# Patient Record
Sex: FEMALE | Race: WHITE
[De-identification: names, ages, dates, MRNs, and addresses within clinical notes are randomized per-mention and may not be internally consistent; named-entity substitution may affect disease eponyms.]

---

## 2017-01-11 ENCOUNTER — PRIOR ORIGINAL RECORDS (OUTPATIENT)
Dept: OTHER | Age: 82
End: 2017-01-11

## 2017-02-22 ENCOUNTER — PRIOR ORIGINAL RECORDS (OUTPATIENT)
Dept: OTHER | Age: 82
End: 2017-02-22

## 2017-02-22 ENCOUNTER — MYAURORA ACCOUNT LINK (OUTPATIENT)
Dept: OTHER | Age: 82
End: 2017-02-22

## 2017-04-19 ENCOUNTER — HOSPITAL (OUTPATIENT)
Dept: OTHER | Age: 82
End: 2017-04-19
Attending: INTERNAL MEDICINE

## 2017-04-19 LAB
25(OH)D3+25(OH)D2 SERPL-MCNC: 38.6 NG/ML (ref 30–100)
AFP-TM SERPL-MCNC: 6 NG/ML (ref 0–9)
ALBUMIN SERPL-MCNC: 3.9 GM/DL (ref 3.6–5.1)
ALBUMIN/GLOB SERPL: 1.3 {RATIO} (ref 1–2.4)
ALP SERPL-CCNC: 103 UNIT/L (ref 45–117)
ALT SERPL-CCNC: 16 UNIT/L
ANALYZER ANC (IANC): NORMAL
ANION GAP SERPL CALC-SCNC: 12 MMOL/L (ref 10–20)
AST SERPL-CCNC: 15 UNIT/L
BILIRUB SERPL-MCNC: 0.5 MG/DL (ref 0.2–1)
BUN SERPL-MCNC: 28 MG/DL (ref 6–20)
BUN/CREAT SERPL: 21 (ref 7–25)
CALCIUM SERPL-MCNC: 8.9 MG/DL (ref 8.4–10.2)
CHLORIDE: 107 MMOL/L (ref 98–107)
CHOLEST SERPL-MCNC: 158 MG/DL
CHOLEST/HDLC SERPL: 2.3 {RATIO}
CO2 SERPL-SCNC: 28 MMOL/L (ref 21–32)
CREAT SERPL-MCNC: 1.35 MG/DL (ref 0.51–0.95)
ERYTHROCYTE [DISTWIDTH] IN BLOOD: 12.7 % (ref 11–15)
GLOBULIN SER-MCNC: 3.1 GM/DL (ref 2–4)
GLUCOSE SERPL-MCNC: 100 MG/DL (ref 65–99)
HDLC SERPL-MCNC: 70 MG/DL
HEMATOCRIT: 37.8 % (ref 36–46.5)
HGB BLD-MCNC: 12.4 GM/DL (ref 12–15.5)
LDLC SERPL CALC-MCNC: 70 MG/DL
LENGTH OF FAST TIME PATIENT: ABNORMAL HR
LENGTH OF FAST TIME PATIENT: NORMAL HR
MCH RBC QN AUTO: 30.3 PG (ref 26–34)
MCHC RBC AUTO-ENTMCNC: 32.8 GM/DL (ref 32–36.5)
MCV RBC AUTO: 92.4 FL (ref 78–100)
NONHDLC SERPL-MCNC: 88 MG/DL
PLATELET # BLD: 142 THOUSAND/MCL (ref 140–450)
POTASSIUM SERPL-SCNC: 5 MMOL/L (ref 3.4–5.1)
PROT SERPL-MCNC: 7 GM/DL (ref 6.4–8.2)
RBC # BLD: 4.09 MILLION/MCL (ref 4–5.2)
SODIUM SERPL-SCNC: 142 MMOL/L (ref 135–145)
TRIGLYCERIDE (TRIGP): 90 MG/DL
WBC # BLD: 5.6 THOUSAND/MCL (ref 4.2–11)

## 2017-04-20 ENCOUNTER — PRIOR ORIGINAL RECORDS (OUTPATIENT)
Dept: OTHER | Age: 82
End: 2017-04-20

## 2017-04-20 LAB
ALBUMIN: 3.9 G/DL
ALKALINE PHOSPHATATE(ALK PHOS): 103 IU/L
ALT (SGPT): 16 U/L
AST (SGOT): 15 U/L
BILIRUBIN TOTAL: 0.5 MG/DL
BILIRUBIN TOTAL: 0.5 MG/DL
BUN: 28 MG/DL
CALCIUM: 8.9 MG/DL
CHLORIDE: 107 MEQ/L
CHOLESTEROL, TOTAL: 158 MG/DL
CREATININE, SERUM: 1.35 MG/DL
GLOBULIN: 3.1 G/DL
GLUCOSE: 100 MG/DL
GLUCOSE: 100 MG/DL
HDL CHOLESTEROL: 70 MG/DL
HEMATOCRIT: 37.8 %
HEMOGLOBIN: 12.4 G/DL
LDL CHOLESTEROL: 70 MG/DL
MCH: 30.3 PG
MCHC: 32.8 G/DL
MCV: 92.4 FL
NON-HDL CHOLESTEROL: 88 MG/DL
PLATELETS: 142 K/UL
POTASSIUM, SERUM: 5 MEQ/L
PROTEIN, TOTAL: 7 G/DL
RED BLOOD COUNT: 4.09 X 10-6/U
SGOT (AST): 15 IU/L
SGPT (ALT): 16 IU/L
SODIUM: 142 MEQ/L
TOTAL CHOLESTEROL / HDL RATIO: 2.3 RATIO UN
TRIGLYCERIDES: 90 MG/DL
VITAMIN D 25-OH: 38.6 NG/ML
WHITE BLOOD COUNT: 5.6 X 10-3/U

## 2017-06-05 PROBLEM — M48.061 SPINAL STENOSIS OF LUMBAR REGION AT MULTIPLE LEVELS: Status: ACTIVE | Noted: 2017-06-05

## 2017-06-05 PROBLEM — R26.9 GAIT DIFFICULTY: Status: ACTIVE | Noted: 2017-06-05

## 2017-06-06 PROCEDURE — 84165 PROTEIN E-PHORESIS SERUM: CPT | Performed by: OTHER

## 2017-06-06 PROCEDURE — 82607 VITAMIN B-12: CPT | Performed by: OTHER

## 2017-06-06 PROCEDURE — 86334 IMMUNOFIX E-PHORESIS SERUM: CPT | Performed by: OTHER

## 2017-06-06 PROCEDURE — 83883 ASSAY NEPHELOMETRY NOT SPEC: CPT | Performed by: OTHER

## 2017-08-23 ENCOUNTER — MYAURORA ACCOUNT LINK (OUTPATIENT)
Dept: OTHER | Age: 82
End: 2017-08-23

## 2017-08-23 ENCOUNTER — PRIOR ORIGINAL RECORDS (OUTPATIENT)
Dept: OTHER | Age: 82
End: 2017-08-23

## 2017-09-21 ENCOUNTER — PRIOR ORIGINAL RECORDS (OUTPATIENT)
Dept: OTHER | Age: 82
End: 2017-09-21

## 2017-09-21 ENCOUNTER — HOSPITAL (OUTPATIENT)
Dept: OTHER | Age: 82
End: 2017-09-21
Attending: INTERNAL MEDICINE

## 2017-09-21 LAB
25(OH)D3+25(OH)D2 SERPL-MCNC: 36.3 NG/ML (ref 30–100)
ALBUMIN SERPL-MCNC: 3.7 GM/DL (ref 3.6–5.1)
ALBUMIN/GLOB SERPL: 1.2 {RATIO} (ref 1–2.4)
ALP SERPL-CCNC: 183 UNIT/L (ref 45–117)
ALT SERPL-CCNC: 19 UNIT/L
ANALYZER ANC (IANC): ABNORMAL
ANION GAP SERPL CALC-SCNC: 14 MMOL/L (ref 10–20)
AST SERPL-CCNC: 13 UNIT/L
BILIRUB SERPL-MCNC: 0.5 MG/DL (ref 0.2–1)
BUN SERPL-MCNC: 31 MG/DL (ref 6–20)
BUN/CREAT SERPL: 23 (ref 7–25)
CALCIUM SERPL-MCNC: 8.9 MG/DL (ref 8.4–10.2)
CHLORIDE: 107 MMOL/L (ref 98–107)
CHOLEST SERPL-MCNC: 156 MG/DL
CHOLEST/HDLC SERPL: 2.9 {RATIO}
CO2 SERPL-SCNC: 26 MMOL/L (ref 21–32)
CREAT SERPL-MCNC: 1.32 MG/DL (ref 0.51–0.95)
ERYTHROCYTE [DISTWIDTH] IN BLOOD: 12.9 % (ref 11–15)
GLOBULIN SER-MCNC: 3.2 GM/DL (ref 2–4)
GLUCOSE SERPL-MCNC: 105 MG/DL (ref 65–99)
HDLC SERPL-MCNC: 53 MG/DL
HEMATOCRIT: 36.3 % (ref 36–46.5)
HGB BLD-MCNC: 11.8 GM/DL (ref 12–15.5)
LDLC SERPL CALC-MCNC: 75 MG/DL
LENGTH OF FAST TIME PATIENT: ABNORMAL HR
LENGTH OF FAST TIME PATIENT: NORMAL HR
MCH RBC QN AUTO: 30.6 PG (ref 26–34)
MCHC RBC AUTO-ENTMCNC: 32.5 GM/DL (ref 32–36.5)
MCV RBC AUTO: 94 FL (ref 78–100)
NONHDLC SERPL-MCNC: 103 MG/DL
PLATELET # BLD: 177 THOUSAND/MCL (ref 140–450)
POTASSIUM SERPL-SCNC: 4.7 MMOL/L (ref 3.4–5.1)
PROT SERPL-MCNC: 6.9 GM/DL (ref 6.4–8.2)
RBC # BLD: 3.86 MILLION/MCL (ref 4–5.2)
SODIUM SERPL-SCNC: 142 MMOL/L (ref 135–145)
TRIGLYCERIDE (TRIGP): 141 MG/DL
WBC # BLD: 6.1 THOUSAND/MCL (ref 4.2–11)

## 2017-09-22 LAB
ALBUMIN: 3.7 G/DL
ALKALINE PHOSPHATATE(ALK PHOS): 183 IU/L
ALT (SGPT): 19 U/L
AST (SGOT): 13 U/L
BILIRUBIN TOTAL: 0.5 MG/DL
BUN: 31 MG/DL
CALCIUM: 8.9 MG/DL
CHLORIDE: 107 MEQ/L
CHOLESTEROL, TOTAL: 156 MG/DL
CREATININE, SERUM: 1.32 MG/DL
GLOBULIN: 3.2 G/DL
HDL CHOLESTEROL: 53 MG/DL
HEMATOCRIT: 36.3 %
HEMOGLOBIN: 11.8 G/DL
LDL CHOLESTEROL: 75 MG/DL
MCH: 30.6 PG
MCHC: 32.5 G/DL
MCV: 94 FL
PLATELETS: 177 K/UL
POTASSIUM, SERUM: 4.7 MEQ/L
PROTEIN, TOTAL: 6.9 G/DL
RED BLOOD COUNT: 3.86 X 10-6/U
SGOT (AST): 13 IU/L
SGPT (ALT): 19 IU/L
SODIUM: 142 MEQ/L
TRIGLYCERIDES: 141 MG/DL
VITAMIN D 25-OH: 36.3 NG/ML
WHITE BLOOD COUNT: 6.1 X 10-3/U

## 2017-09-25 ENCOUNTER — PRIOR ORIGINAL RECORDS (OUTPATIENT)
Dept: OTHER | Age: 82
End: 2017-09-25

## 2017-09-27 ENCOUNTER — HOSPITAL ENCOUNTER (OUTPATIENT)
Dept: ELECTROPHYSIOLOGY | Facility: HOSPITAL | Age: 82
Discharge: HOME | End: 2017-09-27
Payer: MEDICARE

## 2017-09-27 PROCEDURE — 95911 NRV CNDJ TEST 9-10 STUDIES: CPT

## 2017-09-27 PROCEDURE — 95885 MUSC TST DONE W/NERV TST LIM: CPT

## 2017-10-11 ENCOUNTER — MYAURORA ACCOUNT LINK (OUTPATIENT)
Dept: OTHER | Age: 82
End: 2017-10-11

## 2017-10-12 ENCOUNTER — PRIOR ORIGINAL RECORDS (OUTPATIENT)
Dept: OTHER | Age: 82
End: 2017-10-12

## 2017-10-18 ENCOUNTER — PRIOR ORIGINAL RECORDS (OUTPATIENT)
Dept: OTHER | Age: 82
End: 2017-10-18

## 2017-10-19 ENCOUNTER — PRIOR ORIGINAL RECORDS (OUTPATIENT)
Dept: OTHER | Age: 82
End: 2017-10-19

## 2017-11-01 ENCOUNTER — HOSPITAL ENCOUNTER (OUTPATIENT)
Dept: CT IMAGING | Facility: HOSPITAL | Age: 82
Discharge: HOME | End: 2017-11-01
Attending: Other
Payer: MEDICARE

## 2017-11-01 DIAGNOSIS — R29.6 FALLS FREQUENTLY: ICD-10-CM

## 2017-11-01 PROCEDURE — 82565 ASSAY OF CREATININE: CPT

## 2017-11-01 PROCEDURE — 70450 CT HEAD/BRAIN W/O DYE: CPT | Performed by: OTHER

## 2017-11-16 ENCOUNTER — PRIOR ORIGINAL RECORDS (OUTPATIENT)
Dept: OTHER | Age: 82
End: 2017-11-16

## 2017-12-07 ENCOUNTER — APPOINTMENT (OUTPATIENT)
Dept: CT IMAGING | Facility: HOSPITAL | Age: 82
End: 2017-12-07
Attending: EMERGENCY MEDICINE
Payer: MEDICARE

## 2017-12-07 ENCOUNTER — APPOINTMENT (OUTPATIENT)
Dept: GENERAL RADIOLOGY | Facility: HOSPITAL | Age: 82
End: 2017-12-07
Attending: EMERGENCY MEDICINE
Payer: MEDICARE

## 2017-12-07 ENCOUNTER — HOSPITAL ENCOUNTER (EMERGENCY)
Facility: HOSPITAL | Age: 82
Discharge: HOME | End: 2017-12-07
Attending: EMERGENCY MEDICINE
Payer: MEDICARE

## 2017-12-07 VITALS
HEIGHT: 66 IN | RESPIRATION RATE: 16 BRPM | OXYGEN SATURATION: 97 % | SYSTOLIC BLOOD PRESSURE: 198 MMHG | WEIGHT: 130 LBS | HEART RATE: 64 BPM | BODY MASS INDEX: 20.89 KG/M2 | TEMPERATURE: 97 F | DIASTOLIC BLOOD PRESSURE: 71 MMHG

## 2017-12-07 DIAGNOSIS — S92.902A FOOT FRACTURE, LEFT, CLOSED, INITIAL ENCOUNTER: Primary | ICD-10-CM

## 2017-12-07 PROCEDURE — 28490 TREAT BIG TOE FRACTURE: CPT

## 2017-12-07 PROCEDURE — 70450 CT HEAD/BRAIN W/O DYE: CPT | Performed by: EMERGENCY MEDICINE

## 2017-12-07 PROCEDURE — 28470 CLTX METATARSAL FX WO MNP EA: CPT

## 2017-12-07 PROCEDURE — 73630 X-RAY EXAM OF FOOT: CPT | Performed by: EMERGENCY MEDICINE

## 2017-12-07 PROCEDURE — 99284 EMERGENCY DEPT VISIT MOD MDM: CPT

## 2017-12-07 RX ORDER — ACETAMINOPHEN 500 MG
TABLET ORAL
Status: COMPLETED
Start: 2017-12-07 | End: 2017-12-07

## 2017-12-07 RX ORDER — ACETAMINOPHEN 500 MG
1000 TABLET ORAL ONCE
Status: COMPLETED | OUTPATIENT
Start: 2017-12-07 | End: 2017-12-07

## 2017-12-07 NOTE — ED PROVIDER NOTES
Patient Seen in: [facility] AND [facility] Emergency Department    History   Patient presents with:  Lower Extremity Injury (musculoskeletal)    Stated Complaint: left foot injury    HPI    81year old female complains of bruising to her left foot after falling th BuPROPion HCl ER, SR, (WELLBUTRIN SR) 150 MG Oral Tablet 12 Hr,  Take 150 mg by mouth 2 (two) times daily. PRADAXA 75 MG Oral Cap,  Take 75 mg by mouth 2 (two) times daily.    Memantine HCl (NAMENDA) 10 MG Oral Tab,  Take 10 mg by mouth 2 (two) times greg Mouth/Throat: Mucous membranes are normal. Mucous membranes are not pale and not cyanotic. Eyes: Conjunctivae and lids are normal. Right conjunctiva is not injected. Left conjunctiva is not injected. No scleral icterus.  Pupils are equal.   Neck: Normal r There is osteopenia. Mild joint space narrowing with small osteophytes at     the first MTP joint. Degenerative changes are seen throughout the PIP and     DIP joints. There is a plantar calcaneal enthesophyte.  There is a small     enthesophyte at the i CALVARIUM: No apparent fracture, mass, or other significant visible     lesion. SINUSES: Limited views demonstrate no significant mucosal thickening or     fluid. ORBITS: Previous  bilateral cataract surgery.          OTHER: Atherosclerotic calc Disposition and Plan     Clinical Impression:  Foot fracture, left, closed, initial encounter  (primary encounter diagnosis)    Disposition:  Discharge    Follow-up:  Jackie Fisher, [address] [facility] [address] [address] [phone]    Schedule

## 2017-12-07 NOTE — ED NOTES
PT safe to DC home per MD. Jason Peer to dress self. DC teaching done, pt verbalizes understanding. Wheeled to exit.

## 2017-12-08 PROBLEM — S92.912A UNSPECIFIED FRACTURE OF LEFT TOE(S), INITIAL ENCOUNTER FOR CLOSED FRACTURE: Status: ACTIVE | Noted: 2017-12-08

## 2017-12-11 PROBLEM — S92.412A DISPLACED FRACTURE OF PROXIMAL PHALANX OF LEFT GREAT TOE, INITIAL ENCOUNTER FOR CLOSED FRACTURE: Status: ACTIVE | Noted: 2017-12-08

## 2017-12-11 PROBLEM — S92.322A CLOSED DISPLACED FRACTURE OF SECOND METATARSAL BONE OF LEFT FOOT, INITIAL ENCOUNTER: Status: ACTIVE | Noted: 2017-12-11

## 2017-12-11 PROBLEM — S92.342A CLOSED DISPLACED FRACTURE OF FOURTH METATARSAL BONE OF LEFT FOOT, INITIAL ENCOUNTER: Status: ACTIVE | Noted: 2017-12-11

## 2017-12-11 PROBLEM — S92.332A CLOSED DISPLACED FRACTURE OF THIRD METATARSAL BONE OF LEFT FOOT, INITIAL ENCOUNTER: Status: ACTIVE | Noted: 2017-12-11

## 2018-02-27 ENCOUNTER — MYAURORA ACCOUNT LINK (OUTPATIENT)
Dept: OTHER | Age: 83
End: 2018-02-27

## 2018-02-27 ENCOUNTER — PRIOR ORIGINAL RECORDS (OUTPATIENT)
Dept: OTHER | Age: 83
End: 2018-02-27

## 2018-05-16 ENCOUNTER — HOSPITAL (OUTPATIENT)
Dept: OTHER | Age: 83
End: 2018-05-16
Attending: INTERNAL MEDICINE

## 2018-05-16 ENCOUNTER — PRIOR ORIGINAL RECORDS (OUTPATIENT)
Dept: OTHER | Age: 83
End: 2018-05-16

## 2018-05-16 LAB
25(OH)D3+25(OH)D2 SERPL-MCNC: 33 NG/ML (ref 30–100)
ALBUMIN SERPL-MCNC: 3.8 GM/DL (ref 3.6–5.1)
ALBUMIN/GLOB SERPL: 1.2 {RATIO} (ref 1–2.4)
ALP SERPL-CCNC: 105 UNIT/L (ref 45–117)
ALT SERPL-CCNC: 21 UNIT/L
ANALYZER ANC (IANC): ABNORMAL
ANION GAP SERPL CALC-SCNC: 11 MMOL/L (ref 10–20)
AST SERPL-CCNC: 16 UNIT/L
BILIRUB SERPL-MCNC: 0.6 MG/DL (ref 0.2–1)
BUN SERPL-MCNC: 34 MG/DL (ref 6–20)
BUN/CREAT SERPL: 21 (ref 7–25)
CALCIUM SERPL-MCNC: 9 MG/DL (ref 8.4–10.2)
CHLORIDE: 108 MMOL/L (ref 98–107)
CHOLEST SERPL-MCNC: 157 MG/DL
CHOLEST/HDLC SERPL: 2 {RATIO}
CO2 SERPL-SCNC: 28 MMOL/L (ref 21–32)
CREAT SERPL-MCNC: 1.62 MG/DL (ref 0.51–0.95)
ERYTHROCYTE [DISTWIDTH] IN BLOOD: 12.6 % (ref 11–15)
GLOBULIN SER-MCNC: 3.3 GM/DL (ref 2–4)
GLUCOSE SERPL-MCNC: 101 MG/DL (ref 65–99)
HDLC SERPL-MCNC: 79 MG/DL
HEMATOCRIT: 36.6 % (ref 36–46.5)
HGB BLD-MCNC: 11.9 GM/DL (ref 12–15.5)
LDLC SERPL CALC-MCNC: 62 MG/DL
LENGTH OF FAST TIME PATIENT: ABNORMAL HR
LENGTH OF FAST TIME PATIENT: NORMAL HR
MCH RBC QN AUTO: 30.1 PG (ref 26–34)
MCHC RBC AUTO-ENTMCNC: 32.5 GM/DL (ref 32–36.5)
MCV RBC AUTO: 92.4 FL (ref 78–100)
NONHDLC SERPL-MCNC: 78 MG/DL
NRBC (NRBCRE): ABNORMAL
PLATELET # BLD: 166 THOUSAND/MCL (ref 140–450)
POTASSIUM SERPL-SCNC: 5.1 MMOL/L (ref 3.4–5.1)
PROT SERPL-MCNC: 7.1 GM/DL (ref 6.4–8.2)
RBC # BLD: 3.96 MILLION/MCL (ref 4–5.2)
SODIUM SERPL-SCNC: 142 MMOL/L (ref 135–145)
TRIGLYCERIDE (TRIGP): 81 MG/DL
WBC # BLD: 5.7 THOUSAND/MCL (ref 4.2–11)

## 2018-05-17 LAB
ALBUMIN: 3.8 G/DL
ALKALINE PHOSPHATATE(ALK PHOS): 105 IU/L
BILIRUBIN TOTAL: 0.6 MG/DL
BUN: 34 MG/DL
CALCIUM: 9 MG/DL
CHLORIDE: 108 MEQ/L
CHOLESTEROL, TOTAL: 157 MG/DL
CREATININE, SERUM: 1.62 MG/DL
GLOBULIN: 3.3 G/DL
GLUCOSE: 101 MG/DL
HDL CHOLESTEROL: 79 MG/DL
HEMATOCRIT: 36.6 %
HEMOGLOBIN: 11.9 G/DL
LDL CHOLESTEROL: 62 MG/DL
MCH: 30.1 PG
MCHC: 32.5 G/DL
MCV: 92.4 FL
PLATELETS: 166 K/UL
POTASSIUM, SERUM: 5.1 MEQ/L
PROTEIN, TOTAL: 7.1 G/DL
RED BLOOD COUNT: 3.96 X 10-6/U
SGOT (AST): 16 IU/L
SGPT (ALT): 21 IU/L
SODIUM: 142 MEQ/L
TRIGLYCERIDES: 81 MG/DL
VITAMIN D 25-OH: 33 NG/ML
WHITE BLOOD COUNT: 5.7 X 10-3/U

## 2018-05-30 ENCOUNTER — PRIOR ORIGINAL RECORDS (OUTPATIENT)
Dept: OTHER | Age: 83
End: 2018-05-30

## 2018-06-06 ENCOUNTER — APPOINTMENT (OUTPATIENT)
Dept: GENERAL RADIOLOGY | Facility: HOSPITAL | Age: 83
End: 2018-06-06
Attending: EMERGENCY MEDICINE
Payer: MEDICARE

## 2018-06-06 ENCOUNTER — APPOINTMENT (OUTPATIENT)
Dept: CT IMAGING | Facility: HOSPITAL | Age: 83
End: 2018-06-06
Attending: EMERGENCY MEDICINE
Payer: MEDICARE

## 2018-06-06 ENCOUNTER — HOSPITAL ENCOUNTER (EMERGENCY)
Facility: HOSPITAL | Age: 83
Discharge: HOME | End: 2018-06-06
Attending: EMERGENCY MEDICINE
Payer: MEDICARE

## 2018-06-06 VITALS
WEIGHT: 128 LBS | OXYGEN SATURATION: 96 % | BODY MASS INDEX: 25.13 KG/M2 | TEMPERATURE: 98 F | HEART RATE: 64 BPM | SYSTOLIC BLOOD PRESSURE: 160 MMHG | RESPIRATION RATE: 18 BRPM | DIASTOLIC BLOOD PRESSURE: 78 MMHG | HEIGHT: 60 IN

## 2018-06-06 DIAGNOSIS — S09.90XA INJURY OF HEAD, INITIAL ENCOUNTER: ICD-10-CM
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
## 2018-06-06 DIAGNOSIS — S49.92XA INJURY OF LEFT SHOULDER, INITIAL ENCOUNTER: Primary | ICD-10-CM

## 2018-06-06 PROCEDURE — 70450 CT HEAD/BRAIN W/O DYE: CPT | Performed by: EMERGENCY MEDICINE

## 2018-06-06 PROCEDURE — 99284 EMERGENCY DEPT VISIT MOD MDM: CPT

## 2018-06-06 PROCEDURE — 73030 X-RAY EXAM OF SHOULDER: CPT | Performed by: EMERGENCY MEDICINE

## 2018-06-06 NOTE — ED INITIAL ASSESSMENT (HPI)
c/o mechanical fall on left arm/shoulder yesterday. Now arm is swelling and is bruised + painful.  Denies hitting her head or other injuries

## 2018-06-07 NOTE — ED NOTES
Results reviewed w/ patient by MD. Pt is agreeable and comfortable with DC plan.  AVS/DC instructions reviewed w/ patient and pt understands to FU with PCP as instructed by MD. Home care instructions reviewed at bedside and provided in written form with pap

## 2018-06-07 NOTE — ED PROVIDER NOTES
Patient Seen in: [facility] Emergency Department    History   Patient presents with:  Fall (musculoskeletal, neurologic)    Stated Complaint:     HPI    45-year-old female presents for complaint of left shoulder pain status post fall yesterday abdoulaye Alcohol use: Yes           0.0 oz/week     Comment: Social      Review of Systems   Constitutional: Negative. HENT: Negative. Eyes: Negative. Respiratory: Negative. Cardiovascular: Negative. Gastrointestinal: Negative.     Genitourinary: tenderness, no laceration, no crepitus, no deformity, no swelling and no retraction. Abdominal: There is no tenderness. There is no rigidity, no rebound, no guarding, no tenderness at McBurney's point and negative Samaniego's sign.    Musculoskeletal: Normal discharged with return precautions and primary care follow-up. Imaging:   Xr Shoulder, Complete (min 2 Views), Left (cpt=73030)    Result Date: 6/6/2018  PROCEDURE: XR SHOULDER, COMPLETE (MIN 2 VIEWS), LEFT (CPT=73030)  COMPARISON: None.   INDICATIONS: L apparent. There is no space-occupying lesion, mass effect, or shift of midline structures. The gray-white matter junction is preserved and bilaterally symmetric in appearance.   Scattered periventricular and subcortical white matter hypodensities are presen [address] [facility]  [phone]    Schedule an appointment as soon as possible for a visit in 2 days  For follow up and re-evaluation    We recommend that you schedule follow up care with a primary care provider within the next three months to obtain b

## 2018-06-22 ENCOUNTER — MYAURORA ACCOUNT LINK (OUTPATIENT)
Dept: OTHER | Age: 83
End: 2018-06-22

## 2018-08-31 ENCOUNTER — HOSPITAL ENCOUNTER (OUTPATIENT)
Facility: HOSPITAL | Age: 83
Setting detail: OBSERVATION
Discharge: NURSING, REHAB OR LONG-TERM CARE | End: 2018-08-31
Attending: EMERGENCY MEDICINE | Admitting: INTERNAL MEDICINE
Payer: MEDICARE

## 2018-08-31 ENCOUNTER — APPOINTMENT (OUTPATIENT)
Dept: CT IMAGING | Facility: HOSPITAL | Age: 83
End: 2018-08-31
Attending: EMERGENCY MEDICINE
Payer: MEDICARE

## 2018-08-31 ENCOUNTER — APPOINTMENT (OUTPATIENT)
Dept: GENERAL RADIOLOGY | Facility: HOSPITAL | Age: 83
End: 2018-08-31
Attending: EMERGENCY MEDICINE
Payer: MEDICARE

## 2018-08-31 VITALS
RESPIRATION RATE: 18 BRPM | HEART RATE: 67 BPM | HEIGHT: 64 IN | OXYGEN SATURATION: 98 % | TEMPERATURE: 98 F | WEIGHT: 136.81 LBS | DIASTOLIC BLOOD PRESSURE: 84 MMHG | SYSTOLIC BLOOD PRESSURE: 172 MMHG | BODY MASS INDEX: 23.36 KG/M2

## 2018-08-31 DIAGNOSIS — R53.1 WEAKNESS GENERALIZED: ICD-10-CM

## 2018-08-31 DIAGNOSIS — E86.0 DEHYDRATION: Primary | ICD-10-CM

## 2018-08-31 DIAGNOSIS — D64.9 ANEMIA, UNSPECIFIED TYPE: ICD-10-CM

## 2018-08-31 LAB
ALBUMIN SERPL BCP-MCNC: 3.9 G/DL (ref 3.5–4.8)
ALP SERPL-CCNC: 97 U/L (ref 32–100)
ALT SERPL-CCNC: 11 U/L (ref 14–54)
ANION GAP SERPL CALC-SCNC: 8 MMOL/L (ref 0–18)
AST SERPL-CCNC: 16 U/L (ref 15–41)
BASOPHILS # BLD: 0 K/UL (ref 0–0.2)
BASOPHILS NFR BLD: 1 %
BILIRUB DIRECT SERPL-MCNC: 0.2 MG/DL (ref 0–0.2)
BILIRUB SERPL-MCNC: 1.2 MG/DL (ref 0.3–1.2)
BILIRUB UR QL: NEGATIVE
BUN SERPL-MCNC: 32 MG/DL (ref 8–20)
BUN/CREAT SERPL: 19.8 (ref 10–20)
CALCIUM SERPL-MCNC: 9 MG/DL (ref 8.5–10.5)
CHLORIDE SERPL-SCNC: 106 MMOL/L (ref 95–110)
CLARITY UR: CLEAR
CO2 SERPL-SCNC: 26 MMOL/L (ref 22–32)
COLOR UR: YELLOW
CREAT SERPL-MCNC: 1.62 MG/DL (ref 0.5–1.5)
EOSINOPHIL # BLD: 0.2 K/UL (ref 0–0.7)
EOSINOPHIL NFR BLD: 2 %
ERYTHROCYTE [DISTWIDTH] IN BLOOD BY AUTOMATED COUNT: 12.4 % (ref 11–15)
GLUCOSE SERPL-MCNC: 101 MG/DL (ref 70–99)
GLUCOSE UR-MCNC: NEGATIVE MG/DL
HCT VFR BLD AUTO: 31.4 % (ref 35–48)
HGB BLD-MCNC: 10.4 G/DL (ref 12–16)
HGB UR QL STRIP.AUTO: NEGATIVE
KETONES UR-MCNC: NEGATIVE MG/DL
LEUKOCYTE ESTERASE UR QL STRIP.AUTO: NEGATIVE
LYMPHOCYTES # BLD: 0.7 K/UL (ref 1–4)
LYMPHOCYTES NFR BLD: 10 %
MCH RBC QN AUTO: 31.2 PG (ref 27–32)
MCHC RBC AUTO-ENTMCNC: 33 G/DL (ref 32–37)
MCV RBC AUTO: 94.5 FL (ref 80–100)
MONOCYTES # BLD: 0.6 K/UL (ref 0–1)
MONOCYTES NFR BLD: 8 %
NEUTROPHILS # BLD AUTO: 5.6 K/UL (ref 1.8–7.7)
NEUTROPHILS NFR BLD: 79 %
NITRITE UR QL STRIP.AUTO: NEGATIVE
OSMOLALITY UR CALC.SUM OF ELEC: 297 MOSM/KG (ref 275–295)
PH UR: 5 [PH] (ref 5–8)
PLATELET # BLD AUTO: 135 K/UL (ref 140–400)
PMV BLD AUTO: 12 FL (ref 7.4–10.3)
POTASSIUM SERPL-SCNC: 4.4 MMOL/L (ref 3.3–5.1)
PROT SERPL-MCNC: 6.6 G/DL (ref 5.9–8.4)
PROT UR-MCNC: NEGATIVE MG/DL
RBC # BLD AUTO: 3.32 M/UL (ref 3.7–5.4)
RBC #/AREA URNS AUTO: 1 /HPF
SODIUM SERPL-SCNC: 140 MMOL/L (ref 136–144)
SP GR UR STRIP: 1.02 (ref 1–1.03)
UROBILINOGEN UR STRIP-ACNC: <2
VIT C UR-MCNC: 40 MG/DL
WBC # BLD AUTO: 7.1 K/UL (ref 4–11)
WBC #/AREA URNS AUTO: 1 /HPF

## 2018-08-31 PROCEDURE — 80076 HEPATIC FUNCTION PANEL: CPT | Performed by: EMERGENCY MEDICINE

## 2018-08-31 PROCEDURE — 93010 ELECTROCARDIOGRAM REPORT: CPT | Performed by: EMERGENCY MEDICINE

## 2018-08-31 PROCEDURE — 80048 BASIC METABOLIC PNL TOTAL CA: CPT | Performed by: EMERGENCY MEDICINE

## 2018-08-31 PROCEDURE — 99285 EMERGENCY DEPT VISIT HI MDM: CPT

## 2018-08-31 PROCEDURE — 71045 X-RAY EXAM CHEST 1 VIEW: CPT | Performed by: EMERGENCY MEDICINE

## 2018-08-31 PROCEDURE — 93005 ELECTROCARDIOGRAM TRACING: CPT

## 2018-08-31 PROCEDURE — 81003 URINALYSIS AUTO W/O SCOPE: CPT | Performed by: EMERGENCY MEDICINE

## 2018-08-31 PROCEDURE — 70450 CT HEAD/BRAIN W/O DYE: CPT | Performed by: EMERGENCY MEDICINE

## 2018-08-31 PROCEDURE — 85025 COMPLETE CBC W/AUTO DIFF WBC: CPT | Performed by: EMERGENCY MEDICINE

## 2018-08-31 PROCEDURE — 96360 HYDRATION IV INFUSION INIT: CPT

## 2018-08-31 RX ORDER — MEMANTINE HYDROCHLORIDE 10 MG/1
10 TABLET ORAL 2 TIMES DAILY
Status: DISCONTINUED | OUTPATIENT
Start: 2018-08-31 | End: 2018-08-31

## 2018-08-31 RX ORDER — URSODIOL 300 MG/1
300 CAPSULE ORAL 3 TIMES DAILY
Status: DISCONTINUED | OUTPATIENT
Start: 2018-08-31 | End: 2018-08-31

## 2018-08-31 RX ORDER — SODIUM CHLORIDE 0.9 % (FLUSH) 0.9 %
3 SYRINGE (ML) INJECTION AS NEEDED
Status: DISCONTINUED | OUTPATIENT
Start: 2018-08-31 | End: 2018-08-31

## 2018-08-31 RX ORDER — SODIUM CHLORIDE 9 MG/ML
125 INJECTION, SOLUTION INTRAVENOUS CONTINUOUS
Status: DISCONTINUED | OUTPATIENT
Start: 2018-08-31 | End: 2018-08-31

## 2018-08-31 RX ORDER — ESCITALOPRAM OXALATE 10 MG/1
20 TABLET ORAL DAILY
Status: DISCONTINUED | OUTPATIENT
Start: 2018-08-31 | End: 2018-08-31

## 2018-08-31 RX ORDER — LATANOPROST 50 UG/ML
1 SOLUTION/ DROPS OPHTHALMIC NIGHTLY
Status: DISCONTINUED | OUTPATIENT
Start: 2018-08-31 | End: 2018-08-31

## 2018-08-31 RX ORDER — HEPARIN SODIUM 5000 [USP'U]/ML
5000 INJECTION, SOLUTION INTRAVENOUS; SUBCUTANEOUS EVERY 12 HOURS SCHEDULED
Status: DISCONTINUED | OUTPATIENT
Start: 2018-08-31 | End: 2018-08-31

## 2018-08-31 RX ORDER — FLUTICASONE PROPIONATE 50 MCG
2 SPRAY, SUSPENSION (ML) NASAL DAILY
Status: DISCONTINUED | OUTPATIENT
Start: 2018-08-31 | End: 2018-08-31

## 2018-08-31 RX ORDER — LOSARTAN POTASSIUM 25 MG/1
25 TABLET ORAL DAILY
Status: DISCONTINUED | OUTPATIENT
Start: 2018-08-31 | End: 2018-08-31

## 2018-08-31 RX ORDER — ATORVASTATIN CALCIUM 40 MG/1
40 TABLET, FILM COATED ORAL NIGHTLY
Status: DISCONTINUED | OUTPATIENT
Start: 2018-08-31 | End: 2018-08-31

## 2018-08-31 RX ORDER — BUPROPION HYDROCHLORIDE 150 MG/1
150 TABLET, EXTENDED RELEASE ORAL 2 TIMES DAILY
Status: DISCONTINUED | OUTPATIENT
Start: 2018-08-31 | End: 2018-08-31

## 2018-08-31 NOTE — ED PROVIDER NOTES
Patient Seen in: [facility] AND [facility] Emergency Department    History   Patient presents with:  Fatigue (constitutional, neurologic)    Stated Complaint: weakness x1 day    HPI    Patient complains of weakness generalized.   Per family she has dementia and s times daily. FLUTICASONE PROPIONATE 50 MCG/ACT Nasal Suspension,  2 SPRAYS BY EACH NARE ROUTE DAILY. BuPROPion HCl ER, SR, (WELLBUTRIN SR) 150 MG Oral Tablet 12 Hr,  Take 150 mg by mouth 2 (two) times daily.    PRADAXA 75 MG Oral Cap,  Take 75 mg by freedom no masses, nl bowel sounds   EXTREMITIES: from, 4 oh pressure he absolutely thank you/5 strength in all 4 ext, no edema  NEURO:as above baseline dementia, no new focal deficit  SKIN: good skin turgor, no  rashes  PSYCH: calm, cooperative,    Differential i acute intracranial process by noncontrast CT technique. 2. Senescent changes of parenchymal volume loss with sequela of chronic microangiopathy and large vessel atherosclerosis. 3. Partial opacification of the right greater than left mastoid tips.  4. Lesse 8/31/2018 Unknown

## 2018-08-31 NOTE — CM/SW NOTE
Addend 422p:     Eli Martin from [facility] informed SW they are able to accept. Eli Martin stated he will contact pt's daughters regarding arrangements and will give daughter a time. SW met w/ pt, pt's  and daughter, Jennifer Lauren and confirmed plan.  Family to transpo

## 2018-08-31 NOTE — H&P
[address] Patient Status:  Observation    8/10/1935 MRN [id number]   Location [address] [place] [address] Attending Edil Miller MD   Hosp Day # 0 PCP Canelo Bernardo MD     Date:  2018  Date of Admi Pacemaker    • Paroxysmal atrial fibrillation [facility])    • Primary biliary cirrhosis ([facility] [address]    • SSS (sick sinus syndrome) ([facility]    • Thyroid nodule 8/28/2014   • Vitamin D deficiency 8/28/2014     Past Surgical History:  No date: CABG  2005: COLONOSCOPY  9/1 breath, cough, wheezing  Cardiovascular:  chest pain, exertional dyspnea, palpitations  Gastrointestinal:  nausea/vomiting, constipation, diarrhea, rectal bleeding, heartburn  Genitourinary:  dysuria, urgency, frequency, incontinence.   Musculoskeletal:  paz Lesser incidental findings as above.     Assessment:  Patient Active Problem List:     Anemia in stage 3 chronic kidney disease (HCC)     Vitamin D deficiency     Thyroid nodule     Primary biliary cholangitis (HCC)     Carotid artery stenosis, asymptomatic family  -continue Namenda    Paroxysmal Afib  -Xarelto changed to 15 mg daily at recommendation of pharmacist due to worsening renal function    Essential Hypertension  -was hypertensive during admission, but hadn't taken her medication today  -per family,

## 2018-08-31 NOTE — PROGRESS NOTES
Patient up to floor, informed RN she was hoping to do respite stay and self pay. Information given to EVERARDO who spoke with francis and were able to get bed for the patient. Patients blood pressure elevated upon arrival to floor.  Per patient and daughter pat

## 2018-08-31 NOTE — CM/SW NOTE
EDCM discussed OBS status, given respite information. Patient current at [facility] with patient's . 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 ISR sent referral to [facility] for respite.

## 2018-09-28 ENCOUNTER — MYAURORA ACCOUNT LINK (OUTPATIENT)
Dept: OTHER | Age: 83
End: 2018-09-28

## 2018-10-29 PROBLEM — S92.332A CLOSED DISPLACED FRACTURE OF THIRD METATARSAL BONE OF LEFT FOOT, INITIAL ENCOUNTER: Status: RESOLVED | Noted: 2017-12-11 | Resolved: 2018-10-29

## 2018-10-29 PROBLEM — S92.412A DISPLACED FRACTURE OF PROXIMAL PHALANX OF LEFT GREAT TOE, INITIAL ENCOUNTER FOR CLOSED FRACTURE: Status: RESOLVED | Noted: 2017-12-08 | Resolved: 2018-10-29

## 2018-10-29 PROBLEM — R53.1 WEAKNESS GENERALIZED: Status: RESOLVED | Noted: 2018-08-31 | Resolved: 2018-10-29

## 2018-10-29 PROBLEM — S92.342A CLOSED DISPLACED FRACTURE OF FOURTH METATARSAL BONE OF LEFT FOOT, INITIAL ENCOUNTER: Status: RESOLVED | Noted: 2017-12-11 | Resolved: 2018-10-29

## 2018-10-29 PROBLEM — E86.0 DEHYDRATION: Status: RESOLVED | Noted: 2018-08-31 | Resolved: 2018-10-29

## 2018-10-29 PROBLEM — S92.322A CLOSED DISPLACED FRACTURE OF SECOND METATARSAL BONE OF LEFT FOOT, INITIAL ENCOUNTER: Status: RESOLVED | Noted: 2017-12-11 | Resolved: 2018-10-29

## 2019-01-01 ENCOUNTER — APPOINTMENT (OUTPATIENT)
Dept: CT IMAGING | Facility: HOSPITAL | Age: 84
DRG: 312 | End: 2019-01-01
Attending: Other
Payer: MEDICARE

## 2019-01-01 ENCOUNTER — TELEPHONE (OUTPATIENT)
Dept: CARDIOLOGY | Age: 84
End: 2019-01-01

## 2019-01-01 ENCOUNTER — APPOINTMENT (OUTPATIENT)
Dept: CARDIOLOGY | Age: 84
End: 2019-01-01
Attending: INTERNAL MEDICINE

## 2019-01-01 ENCOUNTER — HOSPITAL ENCOUNTER (EMERGENCY)
Facility: HOSPITAL | Age: 84
Discharge: HOME | End: 2019-01-01
Attending: EMERGENCY MEDICINE
Payer: MEDICARE

## 2019-01-01 ENCOUNTER — HOSPITAL ENCOUNTER (OUTPATIENT)
Facility: HOSPITAL | Age: 84
Setting detail: OBSERVATION
Discharge: NURSING, REHAB OR LONG-TERM CARE | End: 2019-01-01
Attending: EMERGENCY MEDICINE | Admitting: INTERNAL MEDICINE
Payer: MEDICARE

## 2019-01-01 ENCOUNTER — OFFICE VISIT (OUTPATIENT)
Dept: CARDIOLOGY | Age: 84
End: 2019-01-01

## 2019-01-01 ENCOUNTER — APPOINTMENT (OUTPATIENT)
Dept: GENERAL RADIOLOGY | Facility: HOSPITAL | Age: 84
End: 2019-01-01
Attending: EMERGENCY MEDICINE
Payer: MEDICARE

## 2019-01-01 ENCOUNTER — HOSPITAL ENCOUNTER (INPATIENT)
Facility: HOSPITAL | Age: 84
LOS: 2 days | Discharge: HOME HEALTH | DRG: 312 | End: 2019-01-01
Attending: EMERGENCY MEDICINE | Admitting: HOSPITALIST
Payer: MEDICARE

## 2019-01-01 ENCOUNTER — CLINICAL ABSTRACT (OUTPATIENT)
Dept: CARDIOLOGY | Age: 84
End: 2019-01-01

## 2019-01-01 ENCOUNTER — APPOINTMENT (OUTPATIENT)
Dept: CT IMAGING | Facility: HOSPITAL | Age: 84
DRG: 312 | End: 2019-01-01
Attending: EMERGENCY MEDICINE
Payer: MEDICARE

## 2019-01-01 ENCOUNTER — APPOINTMENT (OUTPATIENT)
Dept: CT IMAGING | Facility: HOSPITAL | Age: 84
End: 2019-01-01
Attending: EMERGENCY MEDICINE
Payer: MEDICARE

## 2019-01-01 ENCOUNTER — ANCILLARY PROCEDURE (OUTPATIENT)
Dept: CARDIOLOGY | Age: 84
End: 2019-01-01
Attending: INTERNAL MEDICINE

## 2019-01-01 ENCOUNTER — EXTERNAL RECORD (OUTPATIENT)
Dept: CARDIOLOGY | Age: 84
End: 2019-01-01

## 2019-01-01 ENCOUNTER — APPOINTMENT (OUTPATIENT)
Dept: CV DIAGNOSTICS | Facility: HOSPITAL | Age: 84
DRG: 312 | End: 2019-01-01
Attending: HOSPITALIST
Payer: MEDICARE

## 2019-01-01 VITALS
BODY MASS INDEX: 25 KG/M2 | WEIGHT: 143.19 LBS | DIASTOLIC BLOOD PRESSURE: 64 MMHG | TEMPERATURE: 98 F | RESPIRATION RATE: 18 BRPM | SYSTOLIC BLOOD PRESSURE: 136 MMHG | HEART RATE: 63 BPM | OXYGEN SATURATION: 97 %

## 2019-01-01 VITALS
HEART RATE: 63 BPM | HEIGHT: 67 IN | WEIGHT: 141 LBS | RESPIRATION RATE: 18 BRPM | DIASTOLIC BLOOD PRESSURE: 72 MMHG | SYSTOLIC BLOOD PRESSURE: 136 MMHG | OXYGEN SATURATION: 92 % | TEMPERATURE: 99 F | BODY MASS INDEX: 22.13 KG/M2

## 2019-01-01 VITALS
OXYGEN SATURATION: 96 % | RESPIRATION RATE: 15 BRPM | SYSTOLIC BLOOD PRESSURE: 169 MMHG | TEMPERATURE: 99 F | HEART RATE: 63 BPM | DIASTOLIC BLOOD PRESSURE: 70 MMHG

## 2019-01-01 VITALS
BODY MASS INDEX: 20.98 KG/M2 | HEART RATE: 64 BPM | SYSTOLIC BLOOD PRESSURE: 200 MMHG | DIASTOLIC BLOOD PRESSURE: 102 MMHG | RESPIRATION RATE: 16 BRPM | HEIGHT: 66 IN

## 2019-01-01 VITALS
DIASTOLIC BLOOD PRESSURE: 70 MMHG | WEIGHT: 130 LBS | HEART RATE: 71 BPM | HEIGHT: 66 IN | SYSTOLIC BLOOD PRESSURE: 168 MMHG | OXYGEN SATURATION: 98 % | BODY MASS INDEX: 20.89 KG/M2 | RESPIRATION RATE: 17 BRPM

## 2019-01-01 VITALS
BODY MASS INDEX: 20.89 KG/M2 | SYSTOLIC BLOOD PRESSURE: 150 MMHG | HEART RATE: 68 BPM | WEIGHT: 130 LBS | HEIGHT: 66 IN | DIASTOLIC BLOOD PRESSURE: 90 MMHG

## 2019-01-01 DIAGNOSIS — I65.29 CAROTID ARTERY STENOSIS: ICD-10-CM

## 2019-01-01 DIAGNOSIS — N18.9 CHRONIC RENAL IMPAIRMENT, UNSPECIFIED CKD STAGE: ICD-10-CM

## 2019-01-01 DIAGNOSIS — I49.5 SICK SINUS SYNDROME (CMD): ICD-10-CM

## 2019-01-01 DIAGNOSIS — N30.00 ACUTE CYSTITIS WITHOUT HEMATURIA: ICD-10-CM

## 2019-01-01 DIAGNOSIS — R55 SYNCOPE, UNSPECIFIED SYNCOPE TYPE: Primary | ICD-10-CM

## 2019-01-01 DIAGNOSIS — I10 ELEVATED BLOOD PRESSURE READING IN OFFICE WITH WHITE COAT SYNDROME, WITH DIAGNOSIS OF HYPERTENSION: ICD-10-CM

## 2019-01-01 DIAGNOSIS — E55.9 VITAMIN D DEFICIENCY, UNSPECIFIED: ICD-10-CM

## 2019-01-01 DIAGNOSIS — Z95.1 HX OF CABG: ICD-10-CM

## 2019-01-01 DIAGNOSIS — Z98.890 S/P CAROTID ENDARTERECTOMY: ICD-10-CM

## 2019-01-01 DIAGNOSIS — Z95.0 CARDIAC PACEMAKER: ICD-10-CM

## 2019-01-01 DIAGNOSIS — Z95.0 PACEMAKER: ICD-10-CM

## 2019-01-01 DIAGNOSIS — E78.2 MIXED HYPERLIPIDEMIA: ICD-10-CM

## 2019-01-01 DIAGNOSIS — R56.9 SEIZURES, GENERALIZED CONVULSIVE (HCC): Primary | ICD-10-CM

## 2019-01-01 DIAGNOSIS — I48.92 ATRIAL FLUTTER, UNSPECIFIED TYPE (CMD): ICD-10-CM

## 2019-01-01 DIAGNOSIS — G40.909 SEIZURE DISORDER (HCC): Primary | ICD-10-CM

## 2019-01-01 DIAGNOSIS — I25.10 CORONARY ARTERY DISEASE INVOLVING NATIVE CORONARY ARTERY OF NATIVE HEART WITHOUT ANGINA PECTORIS: Primary | ICD-10-CM

## 2019-01-01 DIAGNOSIS — I65.23 OCCLUSION AND STENOSIS OF BILATERAL CAROTID ARTERIES: ICD-10-CM

## 2019-01-01 DIAGNOSIS — I11.0 HYPERTENSIVE HEART DISEASE WITH CONGESTIVE HEART FAILURE, UNSPECIFIED HEART FAILURE TYPE (CMD): Primary | ICD-10-CM

## 2019-01-01 DIAGNOSIS — N18.30 CHRONIC RENAL DISEASE, STAGE III (CMD): ICD-10-CM

## 2019-01-01 DIAGNOSIS — S00.83XA CONTUSION OF FACE, INITIAL ENCOUNTER: Primary | ICD-10-CM

## 2019-01-01 LAB
25(OH)D3+25(OH)D2 SERPL-MCNC: 34.2 NG/ML
ABSOLUTE IMMATURE GRANULOCYTES (OFFPRE24): NORMAL
ALBUMIN SERPL-MCNC: 3.5 G/DL
ALBUMIN/GLOB SERPL: NORMAL {RATIO}
ALP SERPL-CCNC: 108 U/L
ALT SERPL-CCNC: 7 U/L
ANION GAP SERPL CALC-SCNC: NORMAL MMOL/L
AST SERPL-CCNC: 12 U/L
BASO+EOS+MONOS # BLD: NORMAL 10*3/UL
BASO+EOS+MONOS NFR BLD: NORMAL %
BASOPHILS # BLD: NORMAL 10*3/UL
BASOPHILS NFR BLD: NORMAL %
BILIRUB SERPL-MCNC: 0.37 MG/DL
BUN SERPL-MCNC: 27 MG/DL
BUN/CREAT SERPL: NORMAL
CALCIUM SERPL-MCNC: 8.9 MG/DL
CHLORIDE SERPL-SCNC: 107 MMOL/L
CHOLEST SERPL-MCNC: 131 MG/DL
CHOLEST/HDLC SERPL: 2.36 {RATIO}
CO2 SERPL-SCNC: NORMAL MMOL/L
CREAT SERPL-MCNC: 1.57 MG/DL
DIFFERENTIAL METHOD BLD: NORMAL
EOSINOPHIL # BLD: NORMAL 10*3/UL
EOSINOPHIL NFR BLD: NORMAL %
ERYTHROCYTE [DISTWIDTH] IN BLOOD: NORMAL %
GLOBULIN SER-MCNC: NORMAL G/DL
GLUCOSE SERPL-MCNC: 79 MG/DL
HCT VFR BLD CALC: 33.5 %
HDLC SERPL-MCNC: 56 MG/DL
HGB BLD-MCNC: 10.7 G/DL
IMMATURE GRANULOCYTES (OFFPRE25): NORMAL
LDLC SERPL CALC-MCNC: 57 MG/DL
LENGTH OF FAST TIME PATIENT: NORMAL H
LENGTH OF FAST TIME PATIENT: NORMAL H
LYMPHOCYTES # BLD: NORMAL 10*3/UL
LYMPHOCYTES NFR BLD: NORMAL %
MCH RBC QN AUTO: NORMAL PG
MCHC RBC AUTO-ENTMCNC: NORMAL G/DL
MCV RBC AUTO: NORMAL FL
MONOCYTES # BLD: NORMAL 10*3/UL
MONOCYTES NFR BLD: NORMAL %
MPV (OFFPRE2): NORMAL
NEUTROPHILS # BLD: NORMAL 10*3/UL
NEUTROPHILS NFR BLD: NORMAL %
NONHDLC SERPL-MCNC: NORMAL MG/DL
NRBC BLD MANUAL-RTO: NORMAL %
PLAT MORPH BLD: NORMAL
PLATELET # BLD: 159 10*3/UL
POTASSIUM SERPL-SCNC: 4.4 MMOL/L
PROT SERPL-MCNC: 5.9 G/DL
RBC # BLD: 3.5 10*6/UL
RBC MORPH BLD: NORMAL
SODIUM SERPL-SCNC: 144 MMOL/L
TRIGL SERPL-MCNC: 91 MG/DL
VLDLC SERPL CALC-MCNC: NORMAL MG/DL
WBC # BLD: 6.59 10*3/UL
WBC MORPH BLD: NORMAL

## 2019-01-01 PROCEDURE — 93010 ELECTROCARDIOGRAM REPORT: CPT | Performed by: EMERGENCY MEDICINE

## 2019-01-01 PROCEDURE — 87088 URINE BACTERIA CULTURE: CPT

## 2019-01-01 PROCEDURE — 99231 SBSQ HOSP IP/OBS SF/LOW 25: CPT | Performed by: OTHER

## 2019-01-01 PROCEDURE — 87186 SC STD MICRODIL/AGAR DIL: CPT

## 2019-01-01 PROCEDURE — 85025 COMPLETE CBC W/AUTO DIFF WBC: CPT

## 2019-01-01 PROCEDURE — 93005 ELECTROCARDIOGRAM TRACING: CPT

## 2019-01-01 PROCEDURE — 82962 GLUCOSE BLOOD TEST: CPT

## 2019-01-01 PROCEDURE — 93306 TTE W/DOPPLER COMPLETE: CPT | Performed by: HOSPITALIST

## 2019-01-01 PROCEDURE — 83690 ASSAY OF LIPASE: CPT | Performed by: EMERGENCY MEDICINE

## 2019-01-01 PROCEDURE — 96374 THER/PROPH/DIAG INJ IV PUSH: CPT

## 2019-01-01 PROCEDURE — 95816 EEG AWAKE AND DROWSY: CPT | Performed by: OTHER

## 2019-01-01 PROCEDURE — 93294 REM INTERROG EVL PM/LDLS PM: CPT | Performed by: INTERNAL MEDICINE

## 2019-01-01 PROCEDURE — 70450 CT HEAD/BRAIN W/O DYE: CPT | Performed by: OTHER

## 2019-01-01 PROCEDURE — 81001 URINALYSIS AUTO W/SCOPE: CPT

## 2019-01-01 PROCEDURE — 80048 BASIC METABOLIC PNL TOTAL CA: CPT | Performed by: EMERGENCY MEDICINE

## 2019-01-01 PROCEDURE — 93010 ELECTROCARDIOGRAM REPORT: CPT | Performed by: INTERNAL MEDICINE

## 2019-01-01 PROCEDURE — 70450 CT HEAD/BRAIN W/O DYE: CPT | Performed by: EMERGENCY MEDICINE

## 2019-01-01 PROCEDURE — 99214 OFFICE O/P EST MOD 30 MIN: CPT | Performed by: INTERNAL MEDICINE

## 2019-01-01 PROCEDURE — 99223 1ST HOSP IP/OBS HIGH 75: CPT | Performed by: OTHER

## 2019-01-01 PROCEDURE — 96365 THER/PROPH/DIAG IV INF INIT: CPT

## 2019-01-01 PROCEDURE — 80048 BASIC METABOLIC PNL TOTAL CA: CPT | Performed by: INTERNAL MEDICINE

## 2019-01-01 PROCEDURE — 99232 SBSQ HOSP IP/OBS MODERATE 35: CPT | Performed by: OTHER

## 2019-01-01 PROCEDURE — 99214 OFFICE O/P EST MOD 30 MIN: CPT | Performed by: NURSE PRACTITIONER

## 2019-01-01 PROCEDURE — 99284 EMERGENCY DEPT VISIT MOD MDM: CPT

## 2019-01-01 PROCEDURE — 73080 X-RAY EXAM OF ELBOW: CPT | Performed by: EMERGENCY MEDICINE

## 2019-01-01 PROCEDURE — 85025 COMPLETE CBC W/AUTO DIFF WBC: CPT | Performed by: EMERGENCY MEDICINE

## 2019-01-01 PROCEDURE — 84484 ASSAY OF TROPONIN QUANT: CPT | Performed by: EMERGENCY MEDICINE

## 2019-01-01 PROCEDURE — 96361 HYDRATE IV INFUSION ADD-ON: CPT

## 2019-01-01 PROCEDURE — 93880 EXTRACRANIAL BILAT STUDY: CPT | Performed by: INTERNAL MEDICINE

## 2019-01-01 PROCEDURE — 97162 PT EVAL MOD COMPLEX 30 MIN: CPT

## 2019-01-01 PROCEDURE — 97530 THERAPEUTIC ACTIVITIES: CPT

## 2019-01-01 PROCEDURE — 80048 BASIC METABOLIC PNL TOTAL CA: CPT

## 2019-01-01 PROCEDURE — 70486 CT MAXILLOFACIAL W/O DYE: CPT | Performed by: EMERGENCY MEDICINE

## 2019-01-01 PROCEDURE — 99285 EMERGENCY DEPT VISIT HI MDM: CPT

## 2019-01-01 PROCEDURE — 80076 HEPATIC FUNCTION PANEL: CPT | Performed by: EMERGENCY MEDICINE

## 2019-01-01 PROCEDURE — 87086 URINE CULTURE/COLONY COUNT: CPT

## 2019-01-01 PROCEDURE — 81001 URINALYSIS AUTO W/SCOPE: CPT | Performed by: EMERGENCY MEDICINE

## 2019-01-01 PROCEDURE — 99204 OFFICE O/P NEW MOD 45 MIN: CPT | Performed by: INTERNAL MEDICINE

## 2019-01-01 RX ORDER — SODIUM CHLORIDE 9 MG/ML
INJECTION, SOLUTION INTRAVENOUS CONTINUOUS
Status: DISCONTINUED | OUTPATIENT
Start: 2019-01-01 | End: 2019-01-01

## 2019-01-01 RX ORDER — LOSARTAN POTASSIUM 25 MG/1
TABLET ORAL
Qty: 90 TABLET | OUTPATIENT
Start: 2019-01-01

## 2019-01-01 RX ORDER — LEVOFLOXACIN 500 MG/1
500 TABLET, FILM COATED ORAL DAILY
Qty: 6 TABLET | Refills: 0 | Status: SHIPPED | OUTPATIENT
Start: 2019-01-01 | End: 2019-01-01

## 2019-01-01 RX ORDER — CEFUROXIME AXETIL 500 MG/1
500 TABLET ORAL DAILY
Status: DISCONTINUED | OUTPATIENT
Start: 2019-01-01 | End: 2019-01-01

## 2019-01-01 RX ORDER — METOCLOPRAMIDE HYDROCHLORIDE 5 MG/ML
10 INJECTION INTRAMUSCULAR; INTRAVENOUS EVERY 8 HOURS PRN
Status: DISCONTINUED | OUTPATIENT
Start: 2019-01-01 | End: 2019-01-01

## 2019-01-01 RX ORDER — LOSARTAN POTASSIUM 25 MG/1
25 TABLET ORAL DAILY
Status: DISCONTINUED | OUTPATIENT
Start: 2019-01-01 | End: 2019-01-01

## 2019-01-01 RX ORDER — LEVETIRACETAM 500 MG/1
500 TABLET ORAL 2 TIMES DAILY
Qty: 60 TABLET | Refills: 0 | Status: SHIPPED | OUTPATIENT
Start: 2019-01-01 | End: 2020-01-01

## 2019-01-01 RX ORDER — TRAVOPROST OPHTHALMIC SOLUTION 0.04 MG/ML
SOLUTION OPHTHALMIC
COMMUNITY
Start: 2019-01-26

## 2019-01-01 RX ORDER — URSODIOL 300 MG/1
300 CAPSULE ORAL 3 TIMES DAILY
Status: DISCONTINUED | OUTPATIENT
Start: 2019-01-01 | End: 2019-01-01

## 2019-01-01 RX ORDER — ACETAMINOPHEN 325 MG/1
650 TABLET ORAL EVERY 6 HOURS PRN
Status: DISCONTINUED | OUTPATIENT
Start: 2019-01-01 | End: 2019-01-01

## 2019-01-01 RX ORDER — ASPIRIN 81 MG/1
81 TABLET ORAL DAILY
Refills: 0 | Status: SHIPPED | COMMUNITY
Start: 2019-01-01

## 2019-01-01 RX ORDER — MEMANTINE HYDROCHLORIDE 10 MG/1
10 TABLET ORAL 2 TIMES DAILY
Status: DISCONTINUED | OUTPATIENT
Start: 2019-01-01 | End: 2019-01-01

## 2019-01-01 RX ORDER — DOCUSATE SODIUM 100 MG/1
100 CAPSULE, LIQUID FILLED ORAL 2 TIMES DAILY
Status: DISCONTINUED | OUTPATIENT
Start: 2019-01-01 | End: 2019-01-01

## 2019-01-01 RX ORDER — HYDRALAZINE HYDROCHLORIDE 20 MG/ML
10 INJECTION INTRAMUSCULAR; INTRAVENOUS EVERY 6 HOURS PRN
Status: DISCONTINUED | OUTPATIENT
Start: 2019-01-01 | End: 2019-01-01

## 2019-01-01 RX ORDER — ASPIRIN 81 MG/1
81 TABLET ORAL DAILY
Status: DISCONTINUED | OUTPATIENT
Start: 2019-01-01 | End: 2019-01-01

## 2019-01-01 RX ORDER — ONDANSETRON 2 MG/ML
4 INJECTION INTRAMUSCULAR; INTRAVENOUS EVERY 6 HOURS PRN
Status: DISCONTINUED | OUTPATIENT
Start: 2019-01-01 | End: 2019-01-01

## 2019-01-01 RX ORDER — LEVOFLOXACIN 500 MG/1
500 TABLET, FILM COATED ORAL ONCE
Status: COMPLETED | OUTPATIENT
Start: 2019-01-01 | End: 2019-01-01

## 2019-01-01 RX ORDER — SODIUM CHLORIDE 0.9 % (FLUSH) 0.9 %
3 SYRINGE (ML) INJECTION AS NEEDED
Status: DISCONTINUED | OUTPATIENT
Start: 2019-01-01 | End: 2019-01-01

## 2019-01-01 RX ORDER — ACETAMINOPHEN 160 MG
TABLET,DISINTEGRATING ORAL
COMMUNITY
End: 2019-01-01

## 2019-01-01 RX ORDER — MEMANTINE HYDROCHLORIDE 10 MG/1
TABLET ORAL
COMMUNITY

## 2019-01-01 RX ORDER — BISACODYL 10 MG
10 SUPPOSITORY, RECTAL RECTAL
Status: DISCONTINUED | OUTPATIENT
Start: 2019-01-01 | End: 2019-01-01

## 2019-01-01 RX ORDER — ATORVASTATIN CALCIUM 40 MG/1
TABLET, FILM COATED ORAL
COMMUNITY
Start: 2014-11-07

## 2019-01-01 RX ORDER — LATANOPROST 50 UG/ML
1 SOLUTION/ DROPS OPHTHALMIC NIGHTLY
Status: DISCONTINUED | OUTPATIENT
Start: 2019-01-01 | End: 2019-01-01

## 2019-01-01 RX ORDER — URSODIOL 300 MG/1
CAPSULE ORAL
COMMUNITY
Start: 2012-10-01

## 2019-01-01 RX ORDER — ESCITALOPRAM OXALATE 20 MG/1
TABLET ORAL
COMMUNITY

## 2019-01-01 RX ORDER — HYDRALAZINE HYDROCHLORIDE 25 MG/1
25 TABLET, FILM COATED ORAL EVERY 4 HOURS PRN
Status: DISCONTINUED | OUTPATIENT
Start: 2019-01-01 | End: 2019-01-01

## 2019-01-01 RX ORDER — ATORVASTATIN CALCIUM 40 MG/1
40 TABLET, FILM COATED ORAL
Status: DISCONTINUED | OUTPATIENT
Start: 2019-01-01 | End: 2019-01-01

## 2019-01-01 RX ORDER — ONDANSETRON 2 MG/ML
4 INJECTION INTRAMUSCULAR; INTRAVENOUS ONCE
Status: COMPLETED | OUTPATIENT
Start: 2019-01-01 | End: 2019-01-01

## 2019-01-01 RX ORDER — LEVETIRACETAM 500 MG/1
TABLET ORAL
Qty: 60 TABLET | OUTPATIENT
Start: 2019-01-01

## 2019-01-01 RX ORDER — AMLODIPINE BESYLATE 2.5 MG/1
2.5 TABLET ORAL DAILY
Qty: 30 TABLET | Refills: 2 | Status: SHIPPED | OUTPATIENT
Start: 2019-01-01 | End: 2020-01-01 | Stop reason: SDUPTHER

## 2019-01-01 RX ORDER — POTASSIUM CHLORIDE 20 MEQ/1
40 TABLET, EXTENDED RELEASE ORAL ONCE
Status: COMPLETED | OUTPATIENT
Start: 2019-01-01 | End: 2019-01-01

## 2019-01-01 RX ORDER — ATORVASTATIN CALCIUM 40 MG/1
40 TABLET, FILM COATED ORAL DAILY
Status: DISCONTINUED | OUTPATIENT
Start: 2019-01-01 | End: 2019-01-01

## 2019-01-01 RX ORDER — AMLODIPINE BESYLATE 2.5 MG/1
2.5 TABLET ORAL DAILY
Status: DISCONTINUED | OUTPATIENT
Start: 2019-01-01 | End: 2019-01-01

## 2019-01-01 RX ORDER — POLYETHYLENE GLYCOL 3350 17 G/17G
17 POWDER, FOR SOLUTION ORAL DAILY PRN
Status: DISCONTINUED | OUTPATIENT
Start: 2019-01-01 | End: 2019-01-01

## 2019-01-01 RX ORDER — BUPROPION HYDROCHLORIDE 150 MG/1
150 TABLET, EXTENDED RELEASE ORAL 2 TIMES DAILY
Status: DISCONTINUED | OUTPATIENT
Start: 2019-01-01 | End: 2019-01-01

## 2019-01-01 RX ORDER — CEFUROXIME AXETIL 500 MG/1
500 TABLET ORAL DAILY
Qty: 5 TABLET | Refills: 0 | Status: SHIPPED | OUTPATIENT
Start: 2019-01-01 | End: 2019-01-01 | Stop reason: ALTCHOICE

## 2019-01-01 RX ORDER — LOSARTAN POTASSIUM 25 MG/1
TABLET ORAL
COMMUNITY
Start: 2018-10-23 | End: 2019-01-01

## 2019-01-01 RX ORDER — ESCITALOPRAM OXALATE 10 MG/1
20 TABLET ORAL DAILY
Status: DISCONTINUED | OUTPATIENT
Start: 2019-01-01 | End: 2019-01-01

## 2019-01-01 RX ORDER — HEPARIN SODIUM 5000 [USP'U]/ML
5000 INJECTION, SOLUTION INTRAVENOUS; SUBCUTANEOUS EVERY 12 HOURS SCHEDULED
Status: DISCONTINUED | OUTPATIENT
Start: 2019-01-01 | End: 2019-01-01

## 2019-01-01 RX ORDER — BUPROPION HYDROCHLORIDE 150 MG/1
TABLET, EXTENDED RELEASE ORAL
COMMUNITY
Start: 2010-05-03

## 2019-01-01 RX ORDER — ASPIRIN 81 MG/1
81 TABLET ORAL
COMMUNITY
Start: 2019-01-01 | End: 2019-01-01 | Stop reason: SDUPTHER

## 2019-01-01 RX ORDER — LEVETIRACETAM 500 MG/1
500 TABLET ORAL 2 TIMES DAILY
Status: DISCONTINUED | OUTPATIENT
Start: 2019-01-01 | End: 2019-01-01

## 2019-01-01 SDOH — HEALTH STABILITY: PHYSICAL HEALTH: ON AVERAGE, HOW MANY MINUTES DO YOU ENGAGE IN EXERCISE AT THIS LEVEL?: 0 MIN

## 2019-01-01 SDOH — HEALTH STABILITY: PHYSICAL HEALTH: ON AVERAGE, HOW MANY DAYS PER WEEK DO YOU ENGAGE IN MODERATE TO STRENUOUS EXERCISE (LIKE A BRISK WALK)?: 0 DAYS

## 2019-01-01 ASSESSMENT — ENCOUNTER SYMPTOMS
WEAKNESS: 1
HEMATOCHEZIA: 0
SUSPICIOUS LESIONS: 0
HEMOPTYSIS: 0
MEMORY LOSS: 1
WEIGHT GAIN: 0
LOSS OF BALANCE: 1
ALLERGIC/IMMUNOLOGIC COMMENTS: NO NEW FOOD ALLERGIES
WEIGHT LOSS: 0
FEVER: 0
LIGHT-HEADEDNESS: 0
COUGH: 0
CHILLS: 0
BRUISES/BLEEDS EASILY: 0

## 2019-01-01 ASSESSMENT — PATIENT HEALTH QUESTIONNAIRE - PHQ9
2. FEELING DOWN, DEPRESSED OR HOPELESS: NOT AT ALL
SUM OF ALL RESPONSES TO PHQ9 QUESTIONS 1 AND 2: 0
SUM OF ALL RESPONSES TO PHQ9 QUESTIONS 1 AND 2: 0
1. LITTLE INTEREST OR PLEASURE IN DOING THINGS: NOT AT ALL

## 2019-02-01 ENCOUNTER — PRIOR ORIGINAL RECORDS (OUTPATIENT)
Dept: OTHER | Age: 84
End: 2019-02-01

## 2019-02-01 ENCOUNTER — LAB ENCOUNTER (OUTPATIENT)
Dept: LAB | Age: 84
End: 2019-02-01
Attending: INTERNAL MEDICINE
Payer: MEDICARE

## 2019-02-01 DIAGNOSIS — I48.92 ATRIAL FLUTTER (HCC): ICD-10-CM

## 2019-02-01 DIAGNOSIS — E55.9 VITAMIN D DEFICIENCY, UNSPECIFIED: Primary | ICD-10-CM

## 2019-02-01 DIAGNOSIS — N18.30 CHRONIC KIDNEY DISEASE, STAGE III (MODERATE) (HCC): ICD-10-CM

## 2019-02-01 DIAGNOSIS — E78.00 HYPERCHOLESTEROLEMIA: ICD-10-CM

## 2019-02-01 LAB
ALBUMIN SERPL-MCNC: 3.9 G/DL (ref 3.1–4.5)
ALBUMIN/GLOB SERPL: 1.1 {RATIO} (ref 1–2)
ALP LIVER SERPL-CCNC: 181 U/L (ref 55–142)
ALT SERPL-CCNC: 17 U/L (ref 14–54)
ANION GAP SERPL CALC-SCNC: 6 MMOL/L (ref 0–18)
AST SERPL-CCNC: 16 U/L (ref 15–41)
BASOPHILS # BLD AUTO: 0.07 X10(3) UL (ref 0–0.2)
BASOPHILS NFR BLD AUTO: 1 %
BILIRUB SERPL-MCNC: 0.5 MG/DL (ref 0.1–2)
BUN BLD-MCNC: 30 MG/DL (ref 8–20)
BUN/CREAT SERPL: 18.1 (ref 10–20)
CALCIUM BLD-MCNC: 9.1 MG/DL (ref 8.3–10.3)
CHLORIDE SERPL-SCNC: 107 MMOL/L (ref 101–111)
CHOLEST SMN-MCNC: 156 MG/DL (ref ?–200)
CO2 SERPL-SCNC: 27 MMOL/L (ref 22–32)
CREAT BLD-MCNC: 1.66 MG/DL (ref 0.55–1.02)
DEPRECATED RDW RBC AUTO: 47.9 FL (ref 35.1–46.3)
EOSINOPHIL # BLD AUTO: 0.36 X10(3) UL (ref 0–0.7)
EOSINOPHIL NFR BLD AUTO: 5 %
ERYTHROCYTE [DISTWIDTH] IN BLOOD BY AUTOMATED COUNT: 13 % (ref 11–15)
GLOBULIN PLAS-MCNC: 3.5 G/DL (ref 2.8–4.4)
GLUCOSE BLD-MCNC: 90 MG/DL (ref 70–99)
HCT VFR BLD AUTO: 37.7 % (ref 35–48)
HDLC SERPL-MCNC: 57 MG/DL (ref 40–59)
HGB BLD-MCNC: 11.3 G/DL (ref 12–16)
IMM GRANULOCYTES # BLD AUTO: 0.04 X10(3) UL (ref 0–1)
IMM GRANULOCYTES NFR BLD: 0.6 %
LDLC SERPL CALC-MCNC: 75 MG/DL (ref ?–100)
LYMPHOCYTES # BLD AUTO: 0.71 X10(3) UL (ref 1–4)
LYMPHOCYTES NFR BLD AUTO: 9.8 %
M PROTEIN MFR SERPL ELPH: 7.4 G/DL (ref 6.4–8.2)
MCH RBC QN AUTO: 29.8 PG (ref 26–34)
MCHC RBC AUTO-ENTMCNC: 30 G/DL (ref 31–37)
MCV RBC AUTO: 99.5 FL (ref 80–100)
MONOCYTES # BLD AUTO: 0.57 X10(3) UL (ref 0.1–1)
MONOCYTES NFR BLD AUTO: 7.9 %
NEUTROPHILS # BLD AUTO: 5.47 X10 (3) UL (ref 1.5–7.7)
NEUTROPHILS # BLD AUTO: 5.47 X10(3) UL (ref 1.5–7.7)
NEUTROPHILS NFR BLD AUTO: 75.7 %
NONHDLC SERPL-MCNC: 99 MG/DL (ref ?–130)
OSMOLALITY SERPL CALC.SUM OF ELEC: 296 MOSM/KG (ref 275–295)
PLATELET # BLD AUTO: 208 10(3)UL (ref 150–450)
POTASSIUM SERPL-SCNC: 4.4 MMOL/L (ref 3.6–5.1)
RBC # BLD AUTO: 3.79 X10(6)UL (ref 3.8–5.3)
SODIUM SERPL-SCNC: 140 MMOL/L (ref 136–144)
TRIGL SERPL-MCNC: 118 MG/DL (ref 30–149)
VIT D+METAB SERPL-MCNC: 39.7 NG/ML (ref 30–100)
VLDLC SERPL CALC-MCNC: 24 MG/DL (ref 0–30)
WBC # BLD AUTO: 7.2 X10(3) UL (ref 4–11)

## 2019-02-01 PROCEDURE — 80053 COMPREHEN METABOLIC PANEL: CPT

## 2019-02-01 PROCEDURE — 80061 LIPID PANEL: CPT

## 2019-02-01 PROCEDURE — 85025 COMPLETE CBC W/AUTO DIFF WBC: CPT

## 2019-02-01 PROCEDURE — 82306 VITAMIN D 25 HYDROXY: CPT

## 2019-02-04 LAB
ALBUMIN: 3.9 G/DL
ALKALINE PHOSPHATATE(ALK PHOS): 181 IU/L
ALT (SGPT): 17 U/L
AST (SGOT): 16 U/L
BILIRUBIN TOTAL: 0.5 MG/DL
BUN: 30 MG/DL
CALCIUM: 9.1 MG/DL
CHLORIDE: 107 MEQ/L
CHOLESTEROL, TOTAL: 156 MG/DL
CREATININE, SERUM: 1.66 MG/DL
GLOBULIN: 3.5 G/DL
GLUCOSE: 90 MG/DL
GLUCOSE: 90 MG/DL
HDL CHOLESTEROL: 57 MG/DL
HEMATOCRIT: 37.7 %
HEMOGLOBIN: 11.3 G/DL
LDL CHOLESTEROL: 75 MG/DL
MCH: 29.8 PG
MCHC: 30 G/DL
MCV: 99.5 FL
PLATELETS: 208 K/UL
POTASSIUM, SERUM: 4.4 MEQ/L
PROTEIN, TOTAL: 7.4 G/DL
RED BLOOD COUNT: 3.79 X 10-6/U
SGOT (AST): 16 IU/L
SGPT (ALT): 17 IU/L
SODIUM: 140 MEQ/L
TRIGLYCERIDES: 118 MG/DL
VITAMIN D 25-OH: 39.7 NG/ML
WHITE BLOOD COUNT: 7.2 X 10-3/U

## 2019-02-07 ENCOUNTER — PRIOR ORIGINAL RECORDS (OUTPATIENT)
Dept: OTHER | Age: 84
End: 2019-02-07

## 2019-02-07 ENCOUNTER — MYAURORA ACCOUNT LINK (OUTPATIENT)
Dept: OTHER | Age: 84
End: 2019-02-07

## 2019-02-28 VITALS
WEIGHT: 131 LBS | HEIGHT: 64 IN | RESPIRATION RATE: 16 BRPM | HEART RATE: 64 BPM | BODY MASS INDEX: 22.36 KG/M2 | SYSTOLIC BLOOD PRESSURE: 140 MMHG | DIASTOLIC BLOOD PRESSURE: 90 MMHG

## 2019-02-28 VITALS
BODY MASS INDEX: 23.05 KG/M2 | RESPIRATION RATE: 16 BRPM | HEIGHT: 64 IN | HEART RATE: 64 BPM | WEIGHT: 135 LBS | SYSTOLIC BLOOD PRESSURE: 140 MMHG | DIASTOLIC BLOOD PRESSURE: 92 MMHG

## 2019-02-28 VITALS — HEIGHT: 64 IN | SYSTOLIC BLOOD PRESSURE: 140 MMHG | DIASTOLIC BLOOD PRESSURE: 80 MMHG | HEART RATE: 60 BPM

## 2019-02-28 VITALS — DIASTOLIC BLOOD PRESSURE: 86 MMHG | HEART RATE: 60 BPM | SYSTOLIC BLOOD PRESSURE: 152 MMHG | HEIGHT: 64 IN

## 2019-02-28 VITALS
HEIGHT: 64 IN | WEIGHT: 137 LBS | RESPIRATION RATE: 16 BRPM | HEART RATE: 66 BPM | BODY MASS INDEX: 23.39 KG/M2 | DIASTOLIC BLOOD PRESSURE: 82 MMHG | SYSTOLIC BLOOD PRESSURE: 130 MMHG

## 2019-03-01 VITALS
SYSTOLIC BLOOD PRESSURE: 142 MMHG | WEIGHT: 135 LBS | HEART RATE: 68 BPM | BODY MASS INDEX: 23.05 KG/M2 | DIASTOLIC BLOOD PRESSURE: 90 MMHG | RESPIRATION RATE: 16 BRPM | HEIGHT: 64 IN

## 2019-03-01 VITALS
WEIGHT: 134 LBS | BODY MASS INDEX: 22.88 KG/M2 | HEIGHT: 64 IN | SYSTOLIC BLOOD PRESSURE: 172 MMHG | DIASTOLIC BLOOD PRESSURE: 100 MMHG | HEART RATE: 64 BPM | RESPIRATION RATE: 16 BRPM

## 2019-03-01 VITALS
HEIGHT: 64 IN | SYSTOLIC BLOOD PRESSURE: 150 MMHG | RESPIRATION RATE: 16 BRPM | DIASTOLIC BLOOD PRESSURE: 60 MMHG | BODY MASS INDEX: 23.22 KG/M2 | HEART RATE: 63 BPM | WEIGHT: 136 LBS

## 2019-03-11 ENCOUNTER — ANCILLARY PROCEDURE (OUTPATIENT)
Dept: CARDIOLOGY | Age: 84
End: 2019-03-11
Attending: INTERNAL MEDICINE

## 2019-03-11 DIAGNOSIS — Z45.018 PACEMAKER REPROGRAMMING/CHECK: ICD-10-CM

## 2019-03-11 PROCEDURE — 93280 PM DEVICE PROGR EVAL DUAL: CPT | Performed by: INTERNAL MEDICINE

## 2019-06-19 NOTE — ED PROVIDER NOTES
[facility] Emergency Department    History   Patient presents with:  Fall (musculoskeletal, neurologic)    Stated Complaint: mechanical fall    HPI    31-year-old female with history of hypertension, hyperlipidemia, paroxysmal atr mechanical fall  Other systems are as noted in HPI. Constitutional and vital signs reviewed. All other systems reviewed and negative except as noted above.     Physical Exam     ED Triage Vitals [06/19/19 1443]   /62   Pulse 69   Resp 18   Temp 29872  [phone]          We recommend that you schedule follow up care with a primary care provider within the next three months to obtain basic health screening including reassessment of your blood pressure.     Medications Prescribed:  Current Dischar

## 2019-06-19 NOTE — ED NOTES
Assumed care of Smith Knight from triage. She is here for eval of bruising/swelling to right face and left elbow/forearm area s/p unwitnessed fall x 2 in past several days. Distal CMS intact to LUE.

## 2019-06-19 NOTE — ED INITIAL ASSESSMENT (HPI)
Mechanical fall over the weekend, unwitnessed fall again today out of bed. Chaz Ice onto carpeted ground. Pt with hx of dementia. Family member states pt has a swollen left elbow and pain to right jaw. Takes xeralto.

## 2019-11-04 PROBLEM — G40.909 SEIZURE DISORDER (HCC): Status: ACTIVE | Noted: 2019-01-01

## 2019-11-04 PROBLEM — N18.9 CHRONIC RENAL IMPAIRMENT, UNSPECIFIED CKD STAGE: Status: ACTIVE | Noted: 2019-01-01

## 2019-11-04 PROBLEM — R56.9 SEIZURE (HCC): Status: ACTIVE | Noted: 2019-01-01

## 2019-11-04 PROBLEM — N30.00 ACUTE CYSTITIS WITHOUT HEMATURIA: Status: ACTIVE | Noted: 2019-01-01

## 2019-11-04 NOTE — ED PROVIDER NOTES
Patient Seen in: [facility] AND [facility] Emergency Department      History   Patient presents with:  Seizures    Stated Complaint:     HPI    46-year-old female with dementia from [facility] reportedly had a witnessed possible generalized tonic-clonic seizure f (Room air)       Current:BP (!) 179/72   Pulse 63   Temp 97.8 °F (36.6 °C) (Tympanic)   Resp 14   SpO2 99%         Physical Exam    Constitutional: Oriented to person but is not oriented to place and time current. She denies pain. Appears well-developed. for panel order CBC WITH DIFFERENTIAL WITH PLATELET.   Procedure                               Abnormality         Status                     ---------                               -----------         ------                     CBC W/ DIFFERENTIAL[[id number] is atherosclerotic calcification of the intracranial vasculature. CONCLUSION:  Mild chronic microvascular ischemic disease without acute intracranial abnormality.   Dictated by (CST): Jordyn Robertson MD on 11/04/2019 at 11:05     Approved by (CST): ERICKA

## 2019-11-04 NOTE — H&P
[facility] Hospitalist H&P       CC: Patient presents with:  Seizures       PCP: Mick Cooper MD    History of Present Illness: 82 y/o f w hx of dementia, depression, hx of afib off ac d/t falls, cad s/p cabg, hx of left cea, tia/cva 2010, htn, hl, sss s/p pm, A above.     OBJECTIVE:  BP (!) 179/72   Pulse 63   Temp 97.8 °F (36.6 °C) (Tympanic)   Resp 14   SpO2 99%   General:  Alert, no distress   Head:  Normocephalic, without obvious abnormality, atraumatic.    Eyes:  Sclera anicteric, No conjunctival pallor, EOMs cholangitis/Primary Biliary Cirrhosis 2012 presents today per  with fully body shaking    Seizure vs tia/cva: ct brain neg, neuro c/s for eeg/mri, hx of left cea, defer echo to neuro    UTI: ua 11 wbc, rocephin    arf on ckd 3: hydrate, hold arb

## 2019-11-04 NOTE — SLP NOTE
ADULT SWALLOWING EVALUATION    ASSESSMENT    ASSESSMENT/OVERALL IMPRESSION:  Patient with growth on roof of mouth. Family and patient were unaware if it was new or had been there for some time.   Oral phase:  Intermittently redued bilabial seal around stra Vitamin D deficiency 8/28/2014       Prior Living Situation: Home with spouse  Diet Prior to Admission: Regular; Thin liquids  Precautions: Aspiration    Patient/Family Goals: to eat    SWALLOWING HISTORY  Current Diet Consistency: NPO  Dysphagia History: n strategies as outlined by  BSSE (clinical evaluation) including Slow rate, Small bites, Small sips, Multiple swallows, Alternate liquids/solids, Upright 90 degrees with occasional assistance 90 % of the time across 2 sessions.   In Progress     FOLLOW UP  T

## 2019-11-04 NOTE — ED NOTES
Orders for admission, patient is aware of plan and ready to go upstairs. Any questions, please call ED RN Berta Carney at extension [phone]  Pt accompanied by  tele box ordered. NO new arrhythmia.

## 2019-11-04 NOTE — CONSULTS
[facility] HOSP - [facility]    Report of Consultation    Rox Rodriguez Patient Status:  Emergency    8/10/1935 MRN [id number]   Location [address] Attending Chasidy Ace MD   Hosp Day # 0 PCP Arin Dodson MD     Date of [facility])    • SSS (sick sinus syndrome) ([facility] [address]    • Thyroid nodule 8/28/2014   • Vitamin D deficiency 8/28/2014       Past Surgical History  Past Surgical History:   Procedure Laterality Date   • CABG     • COLONOSCOPY  2005   • OTHER SURGICAL HISTORY  9/13/16 symmetric, no facial weakness  VIII. Hearing intact. IX. Pallet elevates symmetrically. XI. Shoulder shrug is intact  XII.  Tongue is midline    Motor Exam:  Muscle tone normal  No atrophy or fasciculations  Strength- upper extremities 5/5 proximally and vascular dementia at the baseline. We will not start antiepileptic at this point due to these being one-time event and also an unclear if it is a seizure. Nonetheless EEG will be done.   MRI is not possible due to the pacemaker and therefore CT of the hea

## 2019-11-04 NOTE — ED NOTES
at beside and states that the pt was very weak and wobbly this am. Pt was ambulating to the bathroom where she began to shake uncontrollably. He states the pt then became nonverbal and called the ems. The pt is laughing and speaking with .  Lotus Grandchild

## 2019-11-05 NOTE — PLAN OF CARE
Problem: Delirium  Goal: Minimize duration of delirium  Description  Interventions:  - Encourage use of hearing aids, eye glasses  - Promote highest level of mobility daily  - Provide frequent reorientation  - Promote wakefulness i.e. lights on, blinds o Progressing     Problem: Impaired Swallowing  Goal: Minimize aspiration risk  Description  Interventions:  - Patient should be alert and upright for all feedings (90 degrees preferred)  - Offer food and liquids at a slow rate  - No straws  - Encourage smal you like us to know as we care for you?  Patient is from [facility] independent living with .  - Provide timely, complete, and accurate information to patient/family  - Incorporate patient and family knowledge, values, beliefs, and cultural back

## 2019-11-05 NOTE — PLAN OF CARE
Problem: SAFETY ADULT - FALL  Goal: Free from fall injury  Description  INTERVENTIONS:  - Assess pt frequently for physical needs  - Identify cognitive and physical deficits and behaviors that affect risk of falls.   - [place] fall precautions as indica safety  and administer oxygen as ordered  - Monitor patient for seizure activity, document and report duration and description of seizure to MD/LIP  - If seizure occurs, turn patient to side and suction secretions as needed  - Reorient patient post seizure

## 2019-11-05 NOTE — PROGRESS NOTES
[facility] AND [facility]  Neurology Progress Note    Oli Reynolds Patient Status:  Inpatient    8/10/1935 MRN [id number]   Location [facility] Attending Dorrine Hammans, MD   Hosp Day # 1 PCP Jolanta Hanna MD     Subjective:  Oli Reynolds is a(n) 81 year swelling  Neck- No masses or adenopathy  CV: pulses were palpable and normal, no cyanosis or edema     Neurological:     Mental Status- Alert with normal language, however significant cognitive impairment.   Did not know the year, month or where exactly she on 11/04/2019 at 11:08          Ekg 12-lead    Result Date: 11/4/2019  ECG Report  Interpretation  -------------------------- Sinus Rhythm WITHIN NORMAL LIMITS When compared with ECG of 08/31/2018 13:02:10 sinus has replaced atrial pacemaker Electronically

## 2019-11-05 NOTE — PLAN OF CARE
Problem: Delirium  Goal: Minimize duration of delirium  Description  Interventions:  - Encourage use of hearing aids, eye glasses  - Promote highest level of mobility daily  - Provide frequent reorientation  - Promote wakefulness i.e. lights on, blinds o Progressing     Problem: MUSCULOSKELETAL - ADULT  Goal: Return mobility to safest level of function  Description  INTERVENTIONS:  - Assess patient stability and activity tolerance for standing, transferring and ambulating w/ or w/o assistive devices  - Ass ordered  Outcome: Progressing  Goal: Absence of seizures  Description  INTERVENTIONS  - Monitor for seizure activity  - Administer anti-seizure medications as ordered  - Monitor neurological status  Outcome: Progressing  Goal: Remains free of injury relate

## 2019-11-05 NOTE — PROGRESS NOTES
S - Patient in good spirits and may be going home tomorrow. O - Patient is a practicing Elayne Christine. A - The  provided active listening. P - No follow up is needed.        11/05/19 2253   Clinical Encounter Type   Visited With Patient and fami

## 2019-11-05 NOTE — PHYSICAL THERAPY NOTE
PHYSICAL THERAPY EVALUATION - INPATIENT     Room Number: 524/524-A  Evaluation Date: 11/5/2019  Type of Evaluation: Initial   Physician Order: PT Eval and Treat    Presenting Problem: seizure disorder, episode of decreased alertness/weakness at home  Reas PT;24 hour care/supervision    PLAN  PT Treatment Plan: Bed mobility; Body mechanics; Energy conservation;Patient education; Family education;Strengthening;Transfer training;Balance training  Rehab Potential : Good  Frequency (Obs): 3x/week       PHYSICAL THE with assist by    Sorin Hanson morning service by staff to assist with ADLs    SUBJECTIVE  Patient agreeable to therapy session     PHYSICAL THERAPY EXAMINATION     OBJECTIVE  Precautions: Seizure;Bed/chair alarm  Fall Risk: Standard fall risk    WEIGHT assist    Exercise/Education Provided:  Bed mobility  Body mechanics  Energy conservation  Posture  Transfer training    Patient End of Session: Up in chair;Needs met;Call light within reach;RN aware of session/findings; All patient questions and concerns a

## 2019-11-05 NOTE — PROGRESS NOTES
[facility] Hospitalist Progress Note     CC: Hospital Follow up    PCP: Nate Garner MD       Assessment/Plan:     Principal Problem:    Seizure disorder [facility])  Active Problems:    Seizure ([address]    Acute cystitis without hematuria    Chronic renal impairment, unsp sensory intact  Psych: Affect- normal  SKIN: warm, dry  EXT: no edema      Data Review:       Labs:     Recent Labs   Lab 11/04/19  1031 11/05/19  0618   RBC 3.95 3.66*   HGB 11.9* 11.0*   HCT 37.5 34.5*   MCV 94.9 94.3   MCH 30.1 30.1   MCHC 31.7 31.9   R

## 2019-11-05 NOTE — CM/SW NOTE
MDO received for POLST. Completed POLST was placed on chart. SW intern made copies of the POLST form and placed a copy on the chart. A copy of POLST was sent to registration at tube #210. The pt was provided an additional copy and the original form.

## 2019-11-06 PROBLEM — I35.1 MODERATE AORTIC REGURGITATION: Status: ACTIVE | Noted: 2019-01-01

## 2019-11-06 PROBLEM — R56.9 SEIZURE (HCC): Status: RESOLVED | Noted: 2019-01-01 | Resolved: 2019-01-01

## 2019-11-06 PROBLEM — I07.1 SEVERE TRICUSPID REGURGITATION: Status: ACTIVE | Noted: 2019-01-01

## 2019-11-06 PROBLEM — I27.21 MODERATE PULMONARY ARTERIAL SYSTOLIC HYPERTENSION (HCC): Status: ACTIVE | Noted: 2019-01-01

## 2019-11-06 PROBLEM — I34.0 MODERATE MITRAL REGURGITATION: Status: ACTIVE | Noted: 2019-01-01

## 2019-11-06 PROBLEM — G40.909 SEIZURE DISORDER (HCC): Status: RESOLVED | Noted: 2019-01-01 | Resolved: 2019-01-01

## 2019-11-06 NOTE — OCCUPATIONAL THERAPY NOTE
OCCUPATIONAL THERAPY TREATMENT NOTE - INPATIENT        Room Number: 524/524-A           Presenting Problem: (dehydration/weakness)    Problem List  Active Problems:    Vasovagal syncope    Acute cystitis without hematuria    Chronic renal impairment, unspe ACTIVITIES OF DAILY LIVING ASSESSMENT  AM-PAC ‘6-Clicks’ Inpatient Daily Activity Short Form  How much help from another person does the patient currently need…  -   Putting on and taking off regular lower body clothing?: A Lot  -   Bathing (incl

## 2019-11-06 NOTE — SLP NOTE
SPEECH DAILY NOTE - INPATIENT    ASSESSMENT & PLAN   ASSESSMENT  Pt's  and daughter participated in today's evaluation. Family denies any swallowing difficulties.  SLP thoroughly reviewed safe swallowing compensatory strategies/aspiration precautions session(s). Reviewed with the patient, her , and her daughter.  Family v/u.     Goal met    Goal #3 The patient will utilize compensatory strategies as outlined by  BSSE (clinical evaluation) including Slow rate, Small bites, Small sips, Multiple sw

## 2019-11-06 NOTE — DISCHARGE SUMMARY
[facility] - [facility]    Discharge Summary    Austyn Luevano Patient Status:  Inpatient    8/10/1935 MRN [id number]   Location [facility]W Attending Ángel Hadley, [address] Day # 2 PCP Yair Kim MD     Date of Admission: 2019   Da changes  - no MRI due to PPM for SSS  - EEG no epileptiform activity   - neuro rec no AEDs  - echo - EF 60% - has mild to moderate AR, mild to moderate MR (stable from 2016), does have mod-severe TR which has worsened since prior echo   - likely 2/2 dehydr Unchanged    TRAVATAN Z 0.004 % Ophthalmic Solution  INSTILL 1 DROP IN EACH EYE IN THE EVENING    metoprolol Tartrate 25 MG Oral Tab  Take 0.5 tablets (12.5 mg total) by mouth nightly.     ursodiol 300 MG Oral Cap  Take 1 capsule (300 mg total) by mouth 3 (

## 2019-11-06 NOTE — PROGRESS NOTES
[facility] AND [facility]  Neurology Progress Note    Adalberto Gomez Patient Status:  Inpatient    8/10/1935 MRN [id number]   Location [facility] Attending Kenisha Payan MD   Hosp Day # 2 PCP Zenobia Hawkins MD     Subjective:  Adalberto Gomez is a(n) 81 year groomed.   Head- Normocephalic, atraumatic  Eyes- No redness or swelling  Neck- No masses or adenopathy  CV: pulses were palpable and normal, no cyanosis or edema     Neurological:     Mental Status- Alert with normal language, however significant cognitive by (CST): Eyad Edmonds MD on 11/04/2019 at 11:08          Ekg 12-lead    Result Date: 11/4/2019  ECG Report  Interpretation  -------------------------- Sinus Rhythm WITHIN NORMAL LIMITS When compared with ECG of 08/31/2018 13:02:10 sinus has replaced atria

## 2019-11-06 NOTE — PLAN OF CARE
Problem: Patient/Family Goals  Goal: Patient/Family Long Term Goal  Description  Patient's Long Term Goal: return to [facility] [place] [facility]    Interventions:  - imaging, labs  - See additional Care Plan goals for specific interventions   Outcome: Progr or wet/gurgly vocal quality is noted  Outcome: Progressing     Problem: NEUROLOGICAL - ADULT  Goal: Absence of seizures  Description  INTERVENTIONS  - Monitor for seizure activity  - Administer anti-seizure medications as ordered  - Monitor neurological st

## 2019-11-06 NOTE — CM/SW NOTE
MDO for [facility] services of RN/PT/OT    Patient resides at [facility], [facility] referral sent for [facility] servcies via [facility]. Noted in referral pt is being discharged today 11/6/19. Req'd c/b with SOC date.      [address] [facility] [phone]    CM/SW

## 2019-11-06 NOTE — PROCEDURES
EEG report    REFERRING PHYSICIAN: Leobardo Fabry, DO    PCP and phone number:  Audrey Mohr MD  [phone]    TECHNIQUE: 21 channels of EEG, 2 channels of EOG, and 1 channel of EKG were recorded utilizing the International 10/20 System.  The recordin

## 2019-11-06 NOTE — FACE TO FACE NOTE
[facility]    Face to Face Encounter Note    Christine Singh Patient Status:  Inpatient    8/10/1935 MRN [id number]   Location [facility] Attending Anny Feldman, [address] [facility] Day # 2 PCP Katerine Sanchez MD       I, or a non-physician have been communicated with the patient's community-based physician who will be assuming this patient's home health plan of care.     Date:  11/06/19   Physician:  Honor Burkitt

## 2019-11-06 NOTE — PLAN OF CARE
Problem: Delirium  Goal: Minimize duration of delirium  Description  Interventions:  - Encourage use of hearing aids, eye glasses  - Promote highest level of mobility daily  - Provide frequent reorientation  - Promote wakefulness i.e. lights on, blinds o by Miguel Collier RN  Outcome: Progressing  Note:   Fall precautions in place     Problem: SKIN/TISSUE INTEGRITY - ADULT  Goal: Skin integrity remains intact  Description  INTERVENTIONS  - Assess and document risk factors for pressure ulcer developmen small bites of food and small sips of liquid  - Offer pills one at a time, crush or deliver with applesauce as needed  - Discontinue feeding and notify MD (or speech pathologist) if coughing or persistent throat clearing or wet/gurgly vocal quality is note 1222 by Rosalia Wolfe RN  Outcome: Progressing     Problem: Patient Centered Care  Goal: Patient preferences are identified and integrated in the patient's plan of care  Description  Interventions:  - What would you like us to know as we care for you

## 2019-11-06 NOTE — PROGRESS NOTES
[facility] Pharmacy Note:  Renal Adjustment for Cefuroxime    Rene Boo is a 81year old female who has been prescribed cefuroxime 500 mg every 12 hrs. CrCl is estimated creatinine clearance is 24.3 mL/min (A) (based on SCr of 1.45 mg/dL (H)).  so the dose ha

## 2019-11-06 NOTE — PLAN OF CARE
Problem: Delirium  Goal: Minimize duration of delirium  Description  Interventions:  - Encourage use of hearing aids, eye glasses  - Promote highest level of mobility daily  - Provide frequent reorientation  - Promote wakefulness i.e. lights on, blinds o activity tolerance for standing, transferring and ambulating w/ or w/o assistive devices  - Assist with transfers and ambulation using safe patient handling equipment as needed  - Ensure adequate protection for wounds/incisions during mobilization  - Aaliyah Del Rio Progressing  Goal: Remains free of injury related to seizure activity  Description  INTERVENTIONS:  - Maintain airway, patient safety  and administer oxygen as ordered  - Monitor patient for seizure activity, document and report duration and description of

## 2019-11-26 NOTE — ED INITIAL ASSESSMENT (HPI)
Pt BIBEMS from [place] d/t possible seizure activity. Per EMS pt was eating breakfast and began to convulse. Witnessed by . No hx of seizure. On arrival pt did not exhibit postictal period and was at pt baseline AOx1.  Per EMS BP elevated, chem WNL

## 2019-11-26 NOTE — ED NOTES
Pt's  verbalized understanding of dc instructions, follow up with PMD. Pt hemodynamically stable. Well appearing.

## 2019-11-26 NOTE — ED PROVIDER NOTES
Patient Seen in: [facility] AND [facility] Emergency Department      History   Patient presents with:  Seizure Disorder (neurologic)    Stated Complaint: seizure    HPI    28-year-old with past medical history significant for dementia, depression, atrial fibrill atraumatic. Ears: TM's clear b/l  Nose: Nose normal.   Mouth/Throat: MMM, post OP clear with no exudates  Eyes: Conjunctivae and EOM are normal. PERRLA  Neck: Normal range of motion. Neck supple. No tracheal deviation present.    CV: s1s2+, RRR, no m/r/g, BLUE   RAINBOW DRAW LAVENDER   RAINBOW DRAW LIGHT GREEN   RAINBOW DRAW GOLD   URINE CULTURE, ROUTINE     EKG    Rate, intervals and axes as noted on EKG Report.   Rate: 63 bpm  Rhythm: Sinus Rhythm  Reading: Normal sinus rhythm, LVH, no acute ST changes, no

## 2019-12-11 PROBLEM — R55 SYNCOPE, UNSPECIFIED SYNCOPE TYPE: Status: ACTIVE | Noted: 2019-01-01

## 2019-12-11 PROBLEM — R55 SYNCOPE: Status: ACTIVE | Noted: 2019-01-01

## 2019-12-11 NOTE — ED PROVIDER NOTES
Patient Seen in: [facility] AND [facility] Emergency Department      History   Patient presents with:  Wellness Visit    Stated Complaint: not feeling well    HPI    26-year-old female with dementia, depression, A. fib, CAD status post CABG, history of left CEA, Used    Alcohol use: Yes      Alcohol/week: 0.0 standard drinks      Comment: Social    Drug use: No             Review of Systems    Positive for stated complaint: not feeling well  Other systems are as noted in HPI.   Constitutional and vital signs review American 35 (*)     GFR, -American 40 (*)     All other components within normal limits   HEPATIC FUNCTION PANEL (7) - Abnormal; Notable for the following components:    Alkaline Phosphatase 145 (*)     All other components within normal limits   PO EKG.  Will treat with IV fluids. On reassessment, patient appears much improved. She is awake, alert, answering questions appropriately and making jokes. ED work-up largely unremarkable regarding the cause of her near syncope.   Patient and her

## 2019-12-11 NOTE — ED INITIAL ASSESSMENT (HPI)
Ronnie Juan is here via EMS for eval c/o nausea. History of dementia. Denies other specific complaints.

## 2019-12-12 NOTE — DISCHARGE SUMMARY
General Medicine Discharge Summary     Patient ID:  Marian Primrose  81year old  8/10/1935    Admit date: 12/11/2019    Discharge date and time: 12/12/2019  1:39 PM     Attending Physician: No att. providers found     Consults: IP CONSULT TO HOSPITALIST  IP episode  - ortho statics negative, CT brain ok, ECG without focal findings  - trop negative, neuro exam non - focal  - Pacer interrogated without abnormalities  - recent echo last month with Mild to mod AR, mild to mod MR, mod-severe TR, mod pulm HTN  - no Vitamin D3 50 MCG (2000 UT) Caps  Commonly known as:  VITAMIN D3            FU  Follow-up Information     Magali Reyes MD. Schedule an appointment as soon as possible for a visit in 1 week.     Specialty:  Internal Medicine  Contact information:  [address]

## 2019-12-12 NOTE — PHYSICAL THERAPY NOTE
PHYSICAL THERAPY EVALUATION - INPATIENT     Room Number: 472/576-O  Evaluation Date: 12/12/2019  Type of Evaluation: Initial   Physician Order: PT Eval and Treat    Presenting Problem: (syncope)  Reason for Therapy: Mobility Dysfunction and Discharge Plan biliary cirrhosis (HCC)    • SSS (sick sinus syndrome) (HCC)    • Thyroid nodule 8/28/2014   • Vitamin D deficiency 8/28/2014       Past Surgical History  Past Surgical History:   Procedure Laterality Date   • CABG     • COLONOSCOPY  2005   • OTHER SURGICA Assistance: Maximum assistance  Distance (ft): 2  Assistive Device: (HHA)        Comment : shuffled steps, strong posterior lean    Bed Mobility: SBA    Transfers: SBA    Exercise/Education Provided:  Bed mobility  Gait training  Transfer training    Sharona

## 2019-12-12 NOTE — ED NOTES
Report given to [facility]. Patient and family aware of admission and room number.

## 2019-12-12 NOTE — PLAN OF CARE
Problem: Patient Centered Care  Goal: Patient preferences are identified and integrated in the patient's plan of care  Description  Interventions:  - What would you like us to know as we care for you?  Lives with  ([address] [facility])  - obtain 12 lead EKG if indicated  - Evaluate effectiveness of antiarrhythmic and heart rate control medications as ordered  - Initiate emergency measures for life threatening arrhythmias  - Monitor electrolytes and administer replacement therapy as ordered

## 2019-12-12 NOTE — H&P
[facility] Hospitalist H&P       CC: Patient presents with:  Wellness Visit       PCP: Vita Tristan MD    History of Present Illness:  patient is an 80 y/o f w hx of dementia, depression, hx of afib off ac d/t falls, cad s/p cabg, hx of left cea, tia/cva 2010, h ), Disp: 90 tablet, Rfl: 3  aspirin 81 MG Oral Tab EC, Take 1 tablet (81 mg total) by mouth daily. , Disp: , Rfl: 0  TRAVATAN Z 0.004 % Ophthalmic Solution, INSTILL 1 DROP IN EACH EYE IN THE EVENING, Disp: , Rfl: 6  metoprolol Tartrate 25 MG Oral Tab, Take Abdomen:   Soft, non-tender. Bowel sounds normal. No masses,  No organomegaly. Non distended   Extremities: Extremities normal, atraumatic    Skin: Skin color, texture, turgor normal. No rashes or lesions.     Neurologic: Normal strength, no focal deficit PIV    Dispo: pending clinical course    Outpatient records or previous hospital records reviewed. Further recommendations pending patient's clinical course.   [facility] hospitalist to continue to follow patient while in house    Patient and/or patient's fami

## 2019-12-12 NOTE — ICD/PM
- [place] Sci PPM check today 12/12/19   - no findings on device ,  No events  - working appropriately    Findings d/w HALINA Ryan  S Cardiology  12/12/19

## 2019-12-15 NOTE — CONSULTS
[facility]    Report of Cardiology Consultation    Yifan Corporal Patient Status:  Observation    8/10/1935 MRN [id number]   Location [address] [facility] Attending No att. providers found   Hosp Day # 0 PCP Abigail Rayo MD     Date of Admi Surgical History  Past Surgical History:   Procedure Laterality Date   • CABG     • COLONOSCOPY  2005   • OTHER SURGICAL HISTORY  9/13/16    ORIF left radius   • OTHER SURGICAL HISTORY Left     CEA   • OTHER SURGICAL HISTORY      s/p PPM       Family Histo (H) 12/11/2019    TP 7.1 12/11/2019    AST 18 12/11/2019    ALT 19 12/11/2019    TSH 4.315 06/06/2017     12/11/2019    ESRML 11 06/06/2017    MG 2.4 11/04/2019    TROP <0.045 12/11/2019    B12 660 10/29/2018         Thank you for allowing me to par

## 2020-01-01 ENCOUNTER — ANCILLARY PROCEDURE (OUTPATIENT)
Dept: CARDIOLOGY | Age: 85
End: 2020-01-01
Attending: INTERNAL MEDICINE

## 2020-01-01 ENCOUNTER — TELEPHONE (OUTPATIENT)
Dept: CARDIOLOGY | Age: 85
End: 2020-01-01

## 2020-01-01 ENCOUNTER — APPOINTMENT (OUTPATIENT)
Dept: CARDIOLOGY | Age: 85
End: 2020-01-01

## 2020-01-01 ENCOUNTER — HOSPITAL ENCOUNTER (INPATIENT)
Facility: HOSPITAL | Age: 85
LOS: 3 days | Discharge: NURSING, REHAB OR LONG-TERM CARE | DRG: 690 | End: 2020-01-01
Attending: EMERGENCY MEDICINE | Admitting: HOSPITALIST
Payer: MEDICARE

## 2020-01-01 ENCOUNTER — APPOINTMENT (OUTPATIENT)
Dept: CARDIOLOGY | Age: 85
End: 2020-01-01
Attending: INTERNAL MEDICINE

## 2020-01-01 VITALS
WEIGHT: 139.88 LBS | RESPIRATION RATE: 18 BRPM | SYSTOLIC BLOOD PRESSURE: 149 MMHG | HEIGHT: 65 IN | HEART RATE: 63 BPM | DIASTOLIC BLOOD PRESSURE: 74 MMHG | BODY MASS INDEX: 23.31 KG/M2 | OXYGEN SATURATION: 93 % | TEMPERATURE: 99 F

## 2020-01-01 DIAGNOSIS — R41.82 ALTERED MENTAL STATUS, UNSPECIFIED ALTERED MENTAL STATUS TYPE: ICD-10-CM

## 2020-01-01 DIAGNOSIS — Z95.0 CARDIAC PACEMAKER: ICD-10-CM

## 2020-01-01 DIAGNOSIS — E86.0 DEHYDRATION: ICD-10-CM

## 2020-01-01 DIAGNOSIS — N39.0 URINARY TRACT INFECTION WITHOUT HEMATURIA, SITE UNSPECIFIED: Primary | ICD-10-CM

## 2020-01-01 DIAGNOSIS — I10 ELEVATED BLOOD PRESSURE READING IN OFFICE WITH WHITE COAT SYNDROME, WITH DIAGNOSIS OF HYPERTENSION: ICD-10-CM

## 2020-01-01 LAB
ANION GAP SERPL CALC-SCNC: 6 MMOL/L (ref 0–18)
ANION GAP SERPL CALC-SCNC: 6 MMOL/L (ref 0–18)
BASOPHILS # BLD AUTO: 0.05 X10(3) UL (ref 0–0.2)
BASOPHILS NFR BLD AUTO: 0.6 %
BILIRUB UR QL: NEGATIVE
BUN BLD-MCNC: 32 MG/DL (ref 7–18)
BUN BLD-MCNC: 39 MG/DL (ref 7–18)
BUN/CREAT SERPL: 21.8 (ref 10–20)
BUN/CREAT SERPL: 23.9 (ref 10–20)
CALCIUM BLD-MCNC: 8.3 MG/DL (ref 8.5–10.1)
CALCIUM BLD-MCNC: 8.5 MG/DL (ref 8.5–10.1)
CHLORIDE SERPL-SCNC: 110 MMOL/L (ref 98–112)
CHLORIDE SERPL-SCNC: 113 MMOL/L (ref 98–112)
CO2 SERPL-SCNC: 27 MMOL/L (ref 21–32)
CO2 SERPL-SCNC: 28 MMOL/L (ref 21–32)
COLOR UR: YELLOW
CREAT BLD-MCNC: 1.47 MG/DL (ref 0.55–1.02)
CREAT BLD-MCNC: 1.63 MG/DL (ref 0.55–1.02)
DEPRECATED RDW RBC AUTO: 43.8 FL (ref 35.1–46.3)
EOSINOPHIL # BLD AUTO: 0.29 X10(3) UL (ref 0–0.7)
EOSINOPHIL NFR BLD AUTO: 3.7 %
ERYTHROCYTE [DISTWIDTH] IN BLOOD BY AUTOMATED COUNT: 13.1 % (ref 11–15)
GLUCOSE BLD-MCNC: 88 MG/DL (ref 70–99)
GLUCOSE BLD-MCNC: 95 MG/DL (ref 70–99)
GLUCOSE UR-MCNC: NEGATIVE MG/DL
HCT VFR BLD AUTO: 36.6 % (ref 35–48)
HGB BLD-MCNC: 11.8 G/DL (ref 12–16)
HGB UR QL STRIP.AUTO: NEGATIVE
IMM GRANULOCYTES # BLD AUTO: 0.03 X10(3) UL (ref 0–1)
IMM GRANULOCYTES NFR BLD: 0.4 %
KETONES UR-MCNC: NEGATIVE MG/DL
LEVETIRACETAM (KEPPRA): 14 UG/ML
LYMPHOCYTES # BLD AUTO: 0.74 X10(3) UL (ref 1–4)
LYMPHOCYTES NFR BLD AUTO: 9.5 %
MCH RBC QN AUTO: 29.7 PG (ref 26–34)
MCHC RBC AUTO-ENTMCNC: 32.2 G/DL (ref 31–37)
MCV RBC AUTO: 92.2 FL (ref 80–100)
MONOCYTES # BLD AUTO: 0.76 X10(3) UL (ref 0.1–1)
MONOCYTES NFR BLD AUTO: 9.7 %
NEUTROPHILS # BLD AUTO: 5.96 X10 (3) UL (ref 1.5–7.7)
NEUTROPHILS # BLD AUTO: 5.96 X10(3) UL (ref 1.5–7.7)
NEUTROPHILS NFR BLD AUTO: 76.1 %
NITRITE UR QL STRIP.AUTO: NEGATIVE
OSMOLALITY SERPL CALC.SUM OF ELEC: 307 MOSM/KG (ref 275–295)
OSMOLALITY SERPL CALC.SUM OF ELEC: 308 MOSM/KG (ref 275–295)
PH UR: 7 [PH] (ref 5–8)
PLATELET # BLD AUTO: 179 10(3)UL (ref 150–450)
POTASSIUM SERPL-SCNC: 3.9 MMOL/L (ref 3.5–5.1)
POTASSIUM SERPL-SCNC: 4.3 MMOL/L (ref 3.5–5.1)
PROT UR-MCNC: 30 MG/DL
RBC # BLD AUTO: 3.97 X10(6)UL (ref 3.8–5.3)
RBC #/AREA URNS AUTO: 2 /HPF
SODIUM SERPL-SCNC: 144 MMOL/L (ref 136–145)
SODIUM SERPL-SCNC: 146 MMOL/L (ref 136–145)
SP GR UR STRIP: 1.02 (ref 1–1.03)
UROBILINOGEN UR STRIP-ACNC: <2
WBC # BLD AUTO: 7.8 X10(3) UL (ref 4–11)
WBC #/AREA URNS AUTO: 33 /HPF

## 2020-01-01 PROCEDURE — 99231 SBSQ HOSP IP/OBS SF/LOW 25: CPT | Performed by: REGISTERED NURSE

## 2020-01-01 PROCEDURE — X1094 NO CHARGE VISIT: HCPCS | Performed by: INTERNAL MEDICINE

## 2020-01-01 PROCEDURE — 99222 1ST HOSP IP/OBS MODERATE 55: CPT | Performed by: REGISTERED NURSE

## 2020-01-01 PROCEDURE — 99223 1ST HOSP IP/OBS HIGH 75: CPT | Performed by: OTHER

## 2020-01-01 PROCEDURE — 99232 SBSQ HOSP IP/OBS MODERATE 35: CPT | Performed by: OTHER

## 2020-01-01 PROCEDURE — 99231 SBSQ HOSP IP/OBS SF/LOW 25: CPT | Performed by: OTHER

## 2020-01-01 PROCEDURE — 95816 EEG AWAKE AND DROWSY: CPT | Performed by: OTHER

## 2020-01-01 RX ORDER — VIT A/VIT C/VIT E/ZINC/COPPER 2148-113
1 TABLET ORAL 2 TIMES DAILY
COMMUNITY

## 2020-01-01 RX ORDER — LEVETIRACETAM 500 MG/1
500 TABLET ORAL 2 TIMES DAILY
Status: DISCONTINUED | OUTPATIENT
Start: 2020-01-01 | End: 2020-01-01

## 2020-01-01 RX ORDER — SODIUM CHLORIDE 9 MG/ML
INJECTION, SOLUTION INTRAVENOUS CONTINUOUS
Status: DISCONTINUED | OUTPATIENT
Start: 2020-01-01 | End: 2020-01-01

## 2020-01-01 RX ORDER — LEVETIRACETAM 750 MG/1
750 TABLET ORAL 2 TIMES DAILY
Qty: 60 TABLET | Refills: 0 | Status: SHIPPED | OUTPATIENT
Start: 2020-01-01

## 2020-01-01 RX ORDER — LATANOPROST 50 UG/ML
1 SOLUTION/ DROPS OPHTHALMIC NIGHTLY
Status: DISCONTINUED | OUTPATIENT
Start: 2020-01-01 | End: 2020-01-01

## 2020-01-01 RX ORDER — SODIUM CHLORIDE 0.9 % (FLUSH) 0.9 %
3 SYRINGE (ML) INJECTION AS NEEDED
Status: DISCONTINUED | OUTPATIENT
Start: 2020-01-01 | End: 2020-01-01

## 2020-01-01 RX ORDER — METOCLOPRAMIDE HYDROCHLORIDE 5 MG/ML
5 INJECTION INTRAMUSCULAR; INTRAVENOUS EVERY 8 HOURS PRN
Status: DISCONTINUED | OUTPATIENT
Start: 2020-01-01 | End: 2020-01-01

## 2020-01-01 RX ORDER — AMLODIPINE BESYLATE 5 MG/1
2.5 TABLET ORAL ONCE
Status: COMPLETED | OUTPATIENT
Start: 2020-01-01 | End: 2020-01-01

## 2020-01-01 RX ORDER — ONDANSETRON 2 MG/ML
4 INJECTION INTRAMUSCULAR; INTRAVENOUS EVERY 6 HOURS PRN
Status: DISCONTINUED | OUTPATIENT
Start: 2020-01-01 | End: 2020-01-01

## 2020-01-01 RX ORDER — URSODIOL 300 MG/1
300 CAPSULE ORAL 3 TIMES DAILY
Status: DISCONTINUED | OUTPATIENT
Start: 2020-01-01 | End: 2020-01-01

## 2020-01-01 RX ORDER — AMLODIPINE BESYLATE 5 MG/1
5 TABLET ORAL DAILY
Status: DISCONTINUED | OUTPATIENT
Start: 2020-01-01 | End: 2020-01-01

## 2020-01-01 RX ORDER — ATORVASTATIN CALCIUM 40 MG/1
40 TABLET, FILM COATED ORAL
Status: DISCONTINUED | OUTPATIENT
Start: 2020-01-01 | End: 2020-01-01

## 2020-01-01 RX ORDER — HEPARIN SODIUM 5000 [USP'U]/ML
5000 INJECTION, SOLUTION INTRAVENOUS; SUBCUTANEOUS EVERY 8 HOURS SCHEDULED
Status: DISCONTINUED | OUTPATIENT
Start: 2020-01-01 | End: 2020-01-01

## 2020-01-01 RX ORDER — MEMANTINE HYDROCHLORIDE 10 MG/1
10 TABLET ORAL 2 TIMES DAILY
Status: DISCONTINUED | OUTPATIENT
Start: 2020-01-01 | End: 2020-01-01

## 2020-01-01 RX ORDER — CEFUROXIME AXETIL 250 MG/1
250 TABLET ORAL DAILY
Status: DISCONTINUED | OUTPATIENT
Start: 2020-01-01 | End: 2020-01-01

## 2020-01-01 RX ORDER — ASPIRIN 81 MG/1
81 TABLET ORAL DAILY
Status: DISCONTINUED | OUTPATIENT
Start: 2020-01-01 | End: 2020-01-01

## 2020-01-01 RX ORDER — ONDANSETRON 4 MG/1
4 TABLET, ORALLY DISINTEGRATING ORAL ONCE
Status: DISCONTINUED | OUTPATIENT
Start: 2020-01-01 | End: 2020-01-01

## 2020-01-01 RX ORDER — ESCITALOPRAM OXALATE 10 MG/1
20 TABLET ORAL DAILY
Status: DISCONTINUED | OUTPATIENT
Start: 2020-01-01 | End: 2020-01-01

## 2020-01-01 RX ORDER — CEPHALEXIN 250 MG/5ML
250 POWDER, FOR SUSPENSION ORAL EVERY 12 HOURS SCHEDULED
Qty: 40 ML | Refills: 0 | Status: SHIPPED | OUTPATIENT
Start: 2020-01-01 | End: 2020-01-01

## 2020-01-01 RX ORDER — AMLODIPINE BESYLATE 2.5 MG/1
2.5 TABLET ORAL DAILY
Qty: 30 TABLET | Refills: 5 | Status: SHIPPED | OUTPATIENT
Start: 2020-01-01

## 2020-01-01 RX ORDER — CEPHALEXIN 250 MG/5ML
250 POWDER, FOR SUSPENSION ORAL EVERY 12 HOURS SCHEDULED
Status: DISCONTINUED | OUTPATIENT
Start: 2020-01-01 | End: 2020-01-01

## 2020-01-01 RX ORDER — ACETAMINOPHEN 325 MG/1
650 TABLET ORAL EVERY 6 HOURS PRN
Status: DISCONTINUED | OUTPATIENT
Start: 2020-01-01 | End: 2020-01-01

## 2020-01-01 RX ORDER — AMLODIPINE BESYLATE 5 MG/1
5 TABLET ORAL DAILY
Qty: 90 TABLET | Refills: 3 | Status: SHIPPED | OUTPATIENT
Start: 2020-01-01

## 2020-01-01 RX ORDER — HYDRALAZINE HYDROCHLORIDE 25 MG/1
25 TABLET, FILM COATED ORAL EVERY 4 HOURS PRN
Status: DISCONTINUED | OUTPATIENT
Start: 2020-01-01 | End: 2020-01-01

## 2020-01-01 RX ORDER — BUPROPION HYDROCHLORIDE 150 MG/1
150 TABLET, EXTENDED RELEASE ORAL 2 TIMES DAILY
Status: DISCONTINUED | OUTPATIENT
Start: 2020-01-01 | End: 2020-01-01

## 2020-01-01 RX ORDER — AMLODIPINE BESYLATE 5 MG/1
2.5 TABLET ORAL DAILY
Status: DISCONTINUED | OUTPATIENT
Start: 2020-01-01 | End: 2020-01-01

## 2020-01-25 PROBLEM — R41.82 ALTERED MENTAL STATUS, UNSPECIFIED ALTERED MENTAL STATUS TYPE: Status: ACTIVE | Noted: 2020-01-01

## 2020-01-25 PROBLEM — D64.9 ANEMIA: Status: ACTIVE | Noted: 2020-01-01

## 2020-01-25 PROBLEM — N39.0 URINARY TRACT INFECTION WITHOUT HEMATURIA, SITE UNSPECIFIED: Status: ACTIVE | Noted: 2020-01-01

## 2020-01-25 PROBLEM — E86.0 DEHYDRATION: Status: ACTIVE | Noted: 2020-01-01

## 2020-01-25 PROBLEM — N39.0 URINARY TRACT INFECTION WITHOUT HEMATURIA: Status: ACTIVE | Noted: 2020-01-01

## 2020-01-25 NOTE — PROGRESS NOTES
[facility] Pharmacy Note:  Renal Dose Adjustment for Metoclopramide (REGLAN)    Shyla Dumont has been prescribed Metoclopramide (REGLAN) 10 mg every 8 hours as needed for n/v.    Estimated Creatinine Clearance: 23.5 mL/min (A) (based on SCr of 1.63 mg/dL (H)).

## 2020-01-25 NOTE — ED PROVIDER NOTES
Patient Seen in: [facility] Emergency Department      History   Patient presents with:  Altered Mental Status    Stated Complaint: Petros May    HPI    80 yo female with  at the bedside. She is unable to give history. She has a h/o dementia.   She negative except as noted above.     Physical Exam     ED Triage Vitals   BP 01/25/20 0115 (!) 197/85   Pulse 01/25/20 0115 61   Resp 01/25/20 0115 22   Temp 01/25/20 0124 97 °F (36.1 °C)   Temp src 01/25/20 0124 Temporal   SpO2 01/25/20 0115 96 %   O2 Devic Bacteria Urine Few (*)     Yeast Urine Moderate (*)     All other components within normal limits   BASIC METABOLIC PANEL (8) - Abnormal; Notable for the following components:    BUN 39 (*)     Creatinine 1.63 (*)     BUN/CREA Ratio 23.9 (*)     Calculated Reviewed: 12/4/2019          ICD-10-CM Noted POA    Anemia D64.9 1/25/2020 Yes    Urinary tract infection without hematuria N39.0 1/25/2020 Unknown

## 2020-01-25 NOTE — H&P
[address] Patient Status:  Inpatient    8/10/1935 MRN [id number]   Location [address] [facility] Attending Guanako Manual, [address] [facility] Day # 0 PCP Letty Jain MD     Date:  2020  Date of Admis 8/28/2014   • Depression 8/28/2014   • Essential hypertension with goal blood pressure less than 150/90    • High blood pressure    • Mixed hyperlipidemia    • Osteoarthritis    • Pacemaker    • Paroxysmal atrial fibrillation [facility])    • Primary biliary cir Oral Cap, Take 1 capsule (300 mg total) by mouth 3 (three) times daily. BuPROPion HCl ER, SR, (WELLBUTRIN SR) 150 MG Oral Tablet 12 Hr, Take 150 mg by mouth 2 (two) times daily.   Memantine HCl (NAMENDA) 10 MG Oral Tab, Take 10 mg by mouth 2 (two) times da of Dementia, Depression, AF (no AC due to falls), SSS s/p PPM, CAD s/p CABG, PAD s/p L CEA, hx of TIA/CVA 2010, eHTN, HLD, mild-mod AVR, Mild-mod MVR, mod-severe TVR, moderate pulm HTN, autoimmune cholangitis, PBC who was admitted in 11/2019 for syncope, ?

## 2020-01-25 NOTE — CONSULTS
[facility]    Report of Consultation    Marian Primrose Patient Status:  Inpatient    8/10/1935 MRN [id number]   Location [address] [facility] Attending Joycelyn Davidson, [address] [facility] Day # 0 PCP Ann Manriquez MD     Date of Admission:  20 atrial fibrillation [facility])    • Primary biliary cirrhosis ([facility]     auto immune   • S[facility]) ([facility]    • Stroke ([facility]     tia   • Thyroid nodule 8/28/2014   • Vitamin D deficiency 8/28/2014       Past Surgical History  Past Surgical History: 5,000 Units, 5,000 Units, Subcutaneous, Q8H NUHA  levETIRAcetam (KEPPRA) tab 750 mg, 750 mg, Oral, BID      Multiple Vitamins-Minerals (PRESERVISION AREDS) Oral Tab, Take 1 capsule by mouth 2 (two) times daily.   VITAMIN D3 50 MCG (2000 UT) Oral Cap, TAKE 1 where she was, she had difficulty following more than very simple commands.   Impaired attention span and concentration  Thought process impaired  Memory impaired  Mood intact  Fund of knowledge impaired     Language appears intact     Cranial Nerves:  II.- to UTI in the setting of significant advanced dementia. However in the meantime there is no concern just recently for seizures and patient was started on Keppra.   Therefore it also would be reasonable at this point to increase the dose slightly and even d

## 2020-01-25 NOTE — ED NOTES
Care assumed from Amy Patel [facility]. Pt currently resting. Family at bedside. No distress noted. Will continue to monitor.

## 2020-01-25 NOTE — ED INITIAL ASSESSMENT (HPI)
According to medics pt was found sitting on the floor in her room with her eyes closed not following commands.  When medics helped pt from floor pt opened her eyes but non verbal. At this time pt is not opening her eyes, answering questions, or following co

## 2020-01-26 NOTE — SLP NOTE
ADULT SWALLOWING EVALUATION    ASSESSMENT    ASSESSMENT/OVERALL IMPRESSION:  Pt seen for a bedside swallowing evaluation secondary to history of prior CVA, dementia, and AMS.   The patient is an 81year old female who was brought to the ED after being found per current dietary recommendations. The pt may use a straw. Speech to monitor swallowing at bedside/meals, train swallowing precautions, and monitor CXR. VFSS as needed if overt clinical signs of aspiration are observed or if CXR declines.   Educated pt [facility])    • Stroke ([facility]     tia   • Thyroid nodule 8/28/2014   • Vitamin D deficiency 8/28/2014       Prior Living Situation: Skilled nursing facility  Diet Prior to Admission: Regular; Thin liquids  Precautions: Aspiration    Patient/Family Goals:  To eat strategies independently over 2 session(s).     In Progress   Goal #3 The patient will utilize compensatory strategies as outlined by  BSSE (clinical evaluation) including Slow rate, Small bites, Small sips, Multiple swallows, Alternate liquids/solids, Upri

## 2020-01-26 NOTE — OCCUPATIONAL THERAPY NOTE
OCCUPATIONAL THERAPY EVALUATION - INPATIENT     Room Number: 105/105-A  Evaluation Date: 1/26/2020  Type of Evaluation: Initial  Presenting Problem: syncope    Physician Order: IP Consult to Occupational Therapy  Reason for Therapy: ADL/IADL Dysfunction an Problem List  Principal Problem:    Urinary tract infection without hematuria, site unspecified  Active Problems:    Anemia    Urinary tract infection without hematuria    Altered mental status, unspecified altered mental status type    Dehydration EXAMINATION      OBJECTIVE  Precautions: Seizure;Bed/chair alarm  Fall Risk: High fall risk    PAIN ASSESSMENT  Ratin          ACTIVITY TOLERANCE                         O2 SATURATIONS                COGNITION  Orientation Level:  disoriented x4      P within reach;RN aware of session/findings; All patient questions and concerns addressed; Alarm set; Family present        :

## 2020-01-26 NOTE — PROGRESS NOTES
01/26/20 3142   Clinical Encounter Type   Visited With Family; Patient and family together   Routine Visit Introduction   Patient's Supportive Strategies/Resources per spouse, patient has dimentia - spouse of 58 yrs Sis Og is on edge with an uncertain futur

## 2020-01-26 NOTE — PROGRESS NOTES
[facility] Hospitalist Progress Note   CC: follow-up hospital admission    ASSESSMENT/PLAN:  Cuong Carson is a(n) 81year old female w/ PMHx of Dementia, Depression, AF (no AC due to falls), SSS s/p PPM, CAD s/p CABG, PAD s/p L CEA, hx of TIA/CVA 2010, eHTN, HLD reliant on staff/ for feeding  -  concerned she will not be accepted back at assisted living facility     OBJECTIVE:  Scheduled Meds:   • [START ON 1/27/2020] amLODIPine Besylate  5 mg Oral Daily   • amLODIPine Besylate  2.5 mg Oral Once   • 01/25/20  0128 01/26/20  0509    146*   K 3.9 4.3    113*   CO2 28.0 27.0   BUN 39* 32*       Coagulation:   No results found for: PROTIME, INR, PTT

## 2020-01-26 NOTE — PROGRESS NOTES
[facility] AND [facility]  Neurology Progress Note    Roselia Rome Patient Status:  Inpatient    8/10/1935 MRN [id number]   Location [facility] Attending Guanako Ace, [address] [facility] Day # 1 PCP Letty Jain MD     Subjective:  Roselia Rome is a(n) 80 °F (37.3 °C)  98.6 °F (37 °C) 98.3 °F (36.8 °C)   TempSrc: Oral Oral Oral Oral   SpO2: 96% 93% 93% 93%   Weight:       Height:           General: No apparent distress, well nourished, well groomed.   Head- Normocephalic, atraumatic  Eyes- No redness or swel 113 (H) 01/26/2020    CO2 27.0 01/26/2020    GLU 88 01/26/2020    CA 8.3 (L) 01/26/2020    ALB 3.7 12/11/2019    ALKPHO 145 (H) 12/11/2019    BILT 0.3 12/11/2019    TP 7.1 12/11/2019    AST 18 12/11/2019    ALT 19 12/11/2019    TSH 4.315 06/06/2017    LIP Therefore it also would be reasonable at this point to increase the dose slightly and even do an EEG.   Dose will be increased to 750 mg twice a day    Jim Angle  1/26/2020  2:20 PM

## 2020-01-26 NOTE — PLAN OF CARE
Problem: Patient Centered Care  Goal: Patient preferences are identified and integrated in the patient's plan of care  Description  Interventions:  - What would you like us to know as we care for you? Lives at [facility].  Has not been ambu patient/family/discharge partner in discharge planning  - Arrange for needed discharge resources and transportation as appropriate  - Identify discharge learning needs (meds, wound care, etc)  - Arrange for interpreters to assist at discharge as needed  -

## 2020-01-26 NOTE — PLAN OF CARE
Patient seen by neurologist today, awaiting EEG. Patient alert and oriented to self only. Will continue to monitor.   Problem: Patient Centered Care  Goal: Patient preferences are identified and integrated in the patient's plan of care  Description  Interve to home or other facility with appropriate resources  Description  INTERVENTIONS:  - Identify barriers to discharge w/pt and caregiver  - Include patient/family/discharge partner in discharge planning  - Arrange for needed discharge resources and transport

## 2020-01-26 NOTE — PHYSICAL THERAPY NOTE
PHYSICAL THERAPY EVALUATION - INPATIENT     Room Number: 105/105-A  Evaluation Date: 1/26/2020  Type of Evaluation: Initial   Physician Order: PT Eval and Treat    Presenting Problem: admitted due to AMS;  UTI without hematuria, dehydration, syncope vs se unspecified  Active Problems:    Anemia    Urinary tract infection without hematuria    Altered mental status, unspecified altered mental status type    Dehydration      Past Medical History  Past Medical History:   Diagnosis Date   • Anemia in CKD (chroni assessment    Lower extremity ROM:  Contracted R knee    Lower extremity strength is grossly graded RLE: 2-/5, LLE:  2+/5    Diagnosis  Current Medical Diagnoses:     CKD (chronic kidney disease) stage 3, GFR 30-59 ml/min ([facility])     Anemia in stage 3 chroni goal blood pressure less than 150/90    • High blood pressure    • Mixed hyperlipidemia    • Osteoarthritis    • Pacemaker    • Paroxysmal atrial fibrillation (HCC)    • Primary biliary cirrhosis (HCC)     auto immune   • SSS (sick sinus syndrome) ([facility]

## 2020-01-27 PROBLEM — Z71.89 ADVANCE CARE PLANNING: Status: ACTIVE | Noted: 2020-01-01

## 2020-01-27 PROBLEM — Z71.89 GOALS OF CARE, COUNSELING/DISCUSSION: Status: ACTIVE | Noted: 2020-01-01

## 2020-01-27 NOTE — CONSULTS
[facility] Patient Status:  Inpatient    8/10/1935 MRN [id number]   Location [address] [facility] Attending Guanako Ace, 160[address] [facility] Day # 2 PCP Letty Jain MD     Date of Consult:  to eat and she needs assistance. Denies any abdominal pain, n/v and unsure when she last moved her bowels. +Inc B/B at baseline. No agitation Currently, she is bed bound and needs assistance with all ADL's.     Medical History:  Past Medical History:   Bella sodium chloride 0.9% 100 mL MBP/ADD-vantage, 1 g, Intravenous, Q24H  •  Normal Saline Flush 0.9 % injection 3 mL, 3 mL, Intravenous, PRN  •  acetaminophen (TYLENOL) tab 650 mg, 650 mg, Oral, Q6H PRN  •  ondansetron HCl (ZOFRAN) injection 4 mg, 4 mg, Cassie Thakur Objective:  Vital Signs:  Blood pressure (!) 176/76, pulse 62, temperature 98.1 °F (36.7 °C), temperature source Oral, resp. rate 18, height 5' 5\" (1.651 m), weight 139 lb 14.4 oz (63.5 kg), SpO2 92 %. Body mass index is 23.28 kg/m².   Present Level o Hallie Mcdaniel I provided education on the differences between palliative care and hospice care. Palliative care handout provided.  I discussed the benefits of palliative care to include assistance with arising symptom management needs, extra layer of support, facil palliative care following.  SW to help with dc planning  -Provided emotional support to pt/ who are coping adequately    Advance care planning  -see above for details  -Pt's  Florencio Stern is HCPOA #[phone]  -Pt's previously completed HCP

## 2020-01-27 NOTE — SLP NOTE
SLP attempt for meal assessment per recommendations of 1/26/2020 BSE. RN reports pt in EEG. SLP to f/u as pt available and as schedule permits. Thank you.     Maddy Ignacio M.S. [address] [facility]  Speech Language Pathologist   Phone Number [phone]

## 2020-01-27 NOTE — PLAN OF CARE
Problem: Patient Centered Care  Goal: Patient preferences are identified and integrated in the patient's plan of care  Description  Interventions:  - What would you like us to know as we care for you?  Pt from [place] assisted living  - Provide timely, Arrange for needed discharge resources and transportation as appropriate  - Identify discharge learning needs (meds, wound care, etc)  - Arrange for interpreters to assist at discharge as needed  - Consider post-discharge preferences of patient/family/disc

## 2020-01-27 NOTE — PROCEDURES
EEG report    REFERRING PHYSICIAN: Orlando Mandujano DO    PCP and phone number:  Vanessa Riggins MD  [phone]    TECHNIQUE: 21 channels of EEG, 2 channels of EOG, and 1 channel of EKG were recorded utilizing the International 10/20 System.  The recordin metabolic/hypoxic encephalopathy or bilateral structural process or a medication effect. No focal, lateralized, or epileptiform features are noted.

## 2020-01-27 NOTE — CM/SW NOTE
1/28 1202pm: The pt. Is scheduled to discharge to [facility] today 1/28 at 5p, via [address]. The pt's  is aware and agreeable to discharge and medicar costs. SW notified Allen Domingo RN with [facility] of discharge plan for palliative care.

## 2020-01-27 NOTE — PROGRESS NOTES
[facility] Hospitalist Progress Note   CC: follow-up hospital admission    ASSESSMENT/PLAN:  Roselia Rome is a(n) 81year old female w/ PMHx of Dementia, Depression, AF (no AC due to falls), SSS s/p PPM, CAD s/p CABG, PAD s/p L CEA, hx of TIA/CVA 2010, eHTN, HLD complaints,  at bedside reports improvement     OBJECTIVE:  Scheduled Meds:   • Cefuroxime Axetil  250 mg Oral Daily   • amLODIPine Besylate  5 mg Oral Daily   • aspirin  81 mg Oral Daily   • atorvastatin  40 mg Oral Daily   • buPROPion HCl ER (SR) for:  PROTIME, INR, PTT

## 2020-01-28 NOTE — PLAN OF CARE
Problem: Patient Centered Care  Goal: Patient preferences are identified and integrated in the patient's plan of care  Description  Interventions:  - What would you like us to know as we care for you?  Pt. from [facility] assisted living, here for fall, [place] appropriate resources  Description  INTERVENTIONS:  - Identify barriers to discharge w/pt and caregiver  - Include patient/family/discharge partner in discharge planning  - Arrange for needed discharge resources and transportation as appropriate  - Identif

## 2020-01-28 NOTE — PALLIATIVE CARE NOTE
[facility]  Palliative Care Follow Up    [address] [facility] Patient Status:  Inpatient    8/10/1935 MRN [id number]   Location [facility] 1W Attending Radha Lieberman MD   [facility] Day # 3 PCP Jolanta Hanna MD     Date of Consult: 2020 Day mg, 5 mg, Intravenous, Q8H PRN  •  aspirin EC tab 81 mg, 81 mg, Oral, Daily  •  atorvastatin (LIPITOR) tab 40 mg, 40 mg, Oral, Daily  •  buPROPion HCl ER (SR) (WELLBUTRIN SR) 12 hr tab 150 mg, 150 mg, Oral, BID  •  escitalopram (LEXAPRO) tablet 20 mg, 20 m pain: 0  Non-verbal signs of pain present: NO    Physical Exam:  General: Alert and in no apparent distress. Weak, chronically-ill appearing  HEENT: No focal deficits. Cardiac: Regular rate and rhythm, S1, S2 normal, no murmur, rub or gallop.   Lungs: Martha adequately     Advance care planning  -see above for details  -Pt's  Karla Isaac is [facility] #[phone]  -Pt's previously completed HCPOA/POLST paperwork on file     Palliative Performance Scale 30%     Emotion support provided to patient/family t

## 2020-01-28 NOTE — PROGRESS NOTES
[facility]  Neurology Progress Note    Marisol Milner Patient Status:  Inpatient    8/10/1935 MRN [id number]   Location [facility] 1W Attending Lindy Dallas, [address] [facility] Day # 3 PCP Vita Tristan MD     Subjective:  Marisol Milner is a(n) 81 Oral   SpO2: 94% 93% 93% 93%   Weight:       Height:           General: No apparent distress, well nourished, well groomed.   Head- Normocephalic, atraumatic  Eyes- No redness or swelling  Neck- No masses or adenopathy  CV: pulses were palpable and normal, 01/26/2020    ALB 3.7 12/11/2019    ALKPHO 145 (H) 12/11/2019    BILT 0.3 12/11/2019    TP 7.1 12/11/2019    AST 18 12/11/2019    ALT 19 12/11/2019    TSH 4.315 06/06/2017     12/11/2019    ESRML 11 06/06/2017    MG 2.4 11/04/2019    TROP <0.045 12/ increased to 750 mg twice a day. Palliative care was started. She is here to be discharged from neurology standpoint.     Ju Fortune

## 2020-01-28 NOTE — DISCHARGE SUMMARY
General Medicine Discharge Summary     Patient ID:  Chava Hollis  81year old  8/10/1935    Admit date: 1/25/2020    Discharge date and time: 1/28/20    Attending Physician: MD Alma Delia Poe disorder   Vascular Dementia  Depression   AF - no AC 2/2 falls  CKD3  Hx of left CEA  Hx of TIA/CVA 2010  eHTN  HLD  SSS s/p PPM  Autoimmune cholangitis / PBC - 2012  Depression   Mild to moderate AR  Mild to moderate MR  Moderate to Severe TR  Moderate p MOUTH DAILY    TRAVATAN Z 0.004 % Ophthalmic Solution  INSTILL 1 DROP IN EACH EYE IN THE EVENING    metoprolol Tartrate 25 MG Oral Tab  Take 0.5 tablets (12.5 mg total) by mouth nightly.     ursodiol 300 MG Oral Cap  Take 1 capsule (300 mg total) by mouth 3

## 2020-01-28 NOTE — SLP NOTE
SPEECH DAILY NOTE - INPATIENT    ASSESSMENT & PLAN   ASSESSMENT  RN reports pt tolerates meals and medications with no difficulties. SLP reviewed safe swallowing compensatory strategies/aspiration precautions with the patient.  Pt's AM meal included toast, utilize compensatory strategies as outlined by  BSSE (clinical evaluation) including Slow rate, Small bites, Small sips, Multiple swallows, Alternate liquids/solids, Upright 90 degrees with mild assistance 90 % of the time across 2 sessions.   Provided mild

## 2024-10-21 NOTE — OCCUPATIONAL THERAPY NOTE
OCCUPATIONAL THERAPY EVALUATION - INPATIENT     Room Number: 524/524-A  Evaluation Date: 11/5/2019  Type of Evaluation: Initial  Presenting Problem: (dehydration/weakness)    Physician Order: IP Consult to Occupational Therapy  Reason for Therapy: ADL/IADL may benefit from KAROLINE.      DISCHARGE RECOMMENDATIONS  OT Discharge Recommendations: 24 hour care/supervision;Home with home health PT/OT  OT Device Recommendations: TBD    PLAN  OT Treatment Plan: Patient/Family education;Patient/Family training;Functional wc bound and requires 1 assist for spt to transfer    SUBJECTIVE  Pt voicing no complaints    OCCUPATIONAL THERAPY EXAMINATION      OBJECTIVE  Precautions: Seizure;Bed/chair alarm  Fall Risk: Standard fall risk    PAIN ASSESSMENT  Ratin    ACTIVITY EDDIE Goals  Patients self stated goal is: to return home         Patient will complete grooming task with supervision  Comment:     Patient will maintain static standing w/ min a x 1 min for clothing maagement   Comment:              Goals  on: 20 Male

## (undated) NOTE — IP AVS SNAPSHOT
[facility]            (For Outpatient Use Only) Initial Admit Date: 8/31/2018   Inpt/Obs Admit Date: Inpt: N/A / Obs: 08/31/18   Discharge Date:    Huel Curling:  [de-identified]   MRN: [de-identified]   CSN: [id number]        ENCOUNTER  Patient Clas Subscriber ID:  Pt Rel to Subscriber:    Hospital Account Financial Class: Medicare    August 31, 2018

## (undated) NOTE — ED AVS SNAPSHOT
Shyla Dumotn   MRN: [id number]    Department:  [facility] Emergency Department   Date of Visit:  12/7/2017           Disclosure     Insurance plans vary and the physician(s) referred by the ER may not be covered by your plan.  Please contact yo within the next three months to obtain basic health screening including reassessment of your blood pressure.     IF THERE IS ANY CHANGE OR WORSENING OF YOUR CONDITION, CALL YOUR PRIMARY CARE PHYSICIAN AT ONCE OR RETURN IMMEDIATELY TO THE EMERGENCY DEPARTMEN

## (undated) NOTE — IP AVS SNAPSHOT
Patient Demographics     Address  Su Haley [address] [place] [id number] [facility] [address] Phone  [phone] (Home) *Preferred*  [phone] [facility])      Emergency Contact(s)     Name Relation Home Work Андрей BARBOSA [address] atorvastatin 40 MG Tabs  Commonly known as:  LIPITOR  Next dose due: Tomorrow morning      TAKE 1 TABLET BY MOUTH DAILY   Waqar Duvall MD         buPROPion HCl ER (SR) 150 MG Tb12  Commonly known as:  WELLBUTRIN SR  Next dose due:   Tonight       Take 150 m amLODIPine Besylate 5 MG Tabs  levetiracetam 750 MG Tabs     Please  your prescriptions at the location directed by your doctor or nurse    Bring a paper prescription for each of these medications  cephALEXin 250 MG/5ML Susr           105-105-A - MA Order ID Medication Name Action Time Action Reason Comments    [id number] Heparin Sodium (Porcine) 5000 UNIT/ML injection 5,000 Units 01/28/20 0537 Given              Recent Vital Signs       Most Recent Value   Vitals  149/74 Filed at 01/28/2020 1427   Pu H&P signed by Anastasiya Soliman DO at 1/25/2020  8:25 AM  Version 1 of 1    Author:  Anastasiya Soliman DO Service:  Hospitalist Author Type:  Physician    Filed:  1/25/2020  8:25 AM Date of Service:  1/25/2020  8:09 AM Status:  Signed    :  Isael Vogt • Anemia in CKD (chronic kidney disease) 8/28/2014   • ASHD (arteriosclerotic heart disease)    • Cancer (HCC)     skin CA   • Carotid artery stenosis, asymptomatic, right     60-69% on dopplers 9/2016   • Cataract     removed   • Chronic kidney disease (C daily. (Patient taking differently: Take 2.5 mg by mouth daily.  )  aspirin 81 MG Oral Tab EC, Take 1 tablet (81 mg total) by mouth daily.   ATORVASTATIN 40 MG Oral Tab, TAKE 1 TABLET BY MOUTH DAILY  TRAVATAN Z 0.004 % Ophthalmic Solution, INSTILL 1 DROP IN GLU 95 01/25/2020    CA 8.5 01/25/2020    ALB 3.7 12/11/2019    ALKPHO 145 (H) 12/11/2019    BILT 0.3 12/11/2019    TP 7.1 12/11/2019    AST 18 12/11/2019    ALT 19 12/11/2019    TSH 4.315 06/06/2017     12/11/2019    ESRML 11 06/06/2017    MG 2.4 Dispo: pending work up, possibly home tomorrow      Rebeca Sanchez DO  [facility] Hospitalist[AS.1]             Electronically signed by Jennifer Jones DO on 1/25/2020  8:25 AM   Attribution Steen    AS. 1 - Jennifer Jones DO on 1/25/2020  8:09 AM And then she woke up earlier in the emergency room.   Patient was having unable to provide any history due to the significant dementia    Past Medical History  Past Medical History:   Diagnosis Date   • Anemia in CKD (chronic kidney disease) 8/28/2014   • A Metoclopramide HCl (REGLAN) injection 5 mg, 5 mg, Intravenous, Q8H PRN  0.9% NaCl infusion, , Intravenous, Continuous  amLODIPine Besylate (NORVASC) tab 2.5 mg, 2.5 mg, Oral, Daily  aspirin EC tab 81 mg, 81 mg, Oral, Daily  atorvastatin (LIPITOR) tab 40 mg Sulfa Antibiotics       RASH    Review of Systems:   As in HPI, the rest of the 14 system review was done and was negative    Physical Exam:      01/25/20  0600 01/25/20  0636 01/25/20  1100 01/25/20  1300   BP: (!) 186/83 (!) 175/79 (!) 185/80 (!) 167/79 Finger to nose unable to assess due to the patient not able to comprehend commands fully  Rapid alternating movements intact     Gait:  Unable to assess    Results:     Laboratory Data:  Lab Results   Component Value Date    WBC 7.8 01/25/2020    HGB 11.8 Discharge date and time: No discharge date for patient encounter.      Attending Physician: Chey Mills MD     Primary Care Physician: Adam Gomes MD     Discharge Diagnoses: Dehydration [E86.0]  Urinary tract infection without hematuria, site unspecified SSS s/p PPM  Autoimmune cholangitis / PBC - 2012  Depression   Mild to moderate AR  Mild to moderate MR  Moderate to Severe TR  Moderate pulmonary hypertension      Plan  - patient was found sleeping next to her bed on the floor, bed is maybe 6 inches off metoprolol Tartrate 25 MG Oral Tab  Take 0.5 tablets (12.5 mg total) by mouth nightly. ursodiol 300 MG Oral Cap  Take 1 capsule (300 mg total) by mouth 3 (three) times daily.     BuPROPion HCl ER, SR, (WELLBUTRIN SR) 150 MG Oral Tablet 12 Hr  Take 150 mg PHYSICAL THERAPY ASSESSMENT     PRAVEEN Gallo is ok to see this pt for PT and OT evaluation. Patient is a pleasant 81year old female admitted 1/25/2020 from half-way  for AMS;  UTI without hematuria, dehydration, syncope vs seizure.   Patient's current functional d • Cancer ([facility]     skin CA   • Carotid artery stenosis, asymptomatic, right     60-69% on dopplers 9/2016   • Cataract     removed   • Chronic kidney disease (CKD), stage 3 (moderate)    • Coronary atherosclerosis    • Dementia ([facility] 8/28/2014   • Claudeen Sides Primary biliary cholangitis (HCC)     Carotid artery stenosis, asymptomatic, right     ASHD (arteriosclerotic heart disease)     Paroxysmal atrial fibrillation (HCC)     Stage 3 chronic kidney disease ([facility]     Essential hypertension with goal blood pre • Vitamin D deficiency 8/28/2014[AN.2]             BALANCE  Static Sitting: Poor  Dynamic Sitting: Poor -  Static Standing: Dependent  Dynamic Standing: Dependent      AM-PAC '6-Clicks' INPATIENT SHORT FORM - BASIC MOBILITY  How much difficulty does the pa Filed:  1/26/2020  2:27 PM Date of Service:  1/26/2020  9:55 AM Status:  Signed    :  Ellen Ibarra OT (Occupational Therapist)       OCCUPATIONAL THERAPY EVALUATION - INPATIENT     Room Number: 105/105-A  Evaluation Date: 1/26/2020  Type of Evalu benefit fro[EA.1]m KAROLINE although OT recommends return to senior living[EA.2].      DISCHARGE RECOMMENDATIONS  OT Discharge Recommendations: Home(back to assisted living)  OT Device Recommendations: None    PLAN          OCCUPATIONAL THERAPY MEDICAL/SOCIAL HISTORY Prior Level of [place]:[EA.1] Pt lives in [facility]. Per daughter, pt recently needing 2 person assist for transfers to w/c. She has assist  With bathing, dressing, toileting. She is able to self feed on \"good days. \"[EA. 2]    SUBJECTIVE[EA.1]  P Bedroom Mobility:[EA.1] Pt unable to take sidesteps[EA. 2]    BALANCE ASSESSMENT  Static Sitting:[EA.1] fair[EA. 2]  Dynamic Sitting:[EA.1] fair-[EA. 2]  Static Standing:[EA.1] poor[EA. 2]  Dynamic Standing:[EA.1] NT[EA.2]    FUNCTIONAL ADL ASSESSMENT  Groomin 92-93 t/o the entire session. [BP.1] SLP to f/u with meal assessment x1 to ensure safety and tolerance of diet. Monitor CXR as available. [BP.2]       Diet Recommendations - Solids: Regular  Diet Recommendations - Liquid:  Thin    Compensatory Strategies Bj If you have any questions, please contact Catrachito Kuhn M.S. [address] [facility]  Speech Language Pathologist   Phone Number [phone][BP.1]      Electronically signed by KAITLYN Zepeda on 1/28/2020  8:27 AM   Attribution Key    BP. 1 - P

## (undated) NOTE — IP AVS SNAPSHOT
Patient Demographics     Address  Su Haley [address] [place] [address] [phone] [id number] [facility] [address] Phone  [phone] (Home) *Preferred*      Emergency Contact(s)     Name Relation Home Work Андрей BARBOSA [address] Daughter   948-1 ursodiol 300 MG Caps  Commonly known as:  ACTIGALL  Next dose due:  Please start tonight 8/31      Take 1 capsule (300 mg total) by mouth 3 (three) times daily.    Magda Tejada MD         Vitamin D3 2000 units Caps  Commonly known as:  VITAMIN D3  Next dos Alkaline Phosphatase 97 32 - 100 U/L [employer]   Bilirubin, Total 1.2 0.3 - 1.2 mg/dL [employer]   Total Protein 6.6 5.9 - 8.4 g/dL — [facility]   Albumin 3.9 3.5 - 4.8 g/dL [employer]   Bilirubin, Direct 0.2 0.0 - 0.2 mg/dL [employer] Ketones Urine Negative Negative mg/dL [employer]   Bilirubin Urine Negative Negative — [facility]   Blood Urine Negative Negative — [facility]   Nitrite Urine Negative Negative — [facility]   Urobilinogen Urine <2.0 <2.0 [employer] Filed:  8/31/2018  4:46 PM Date of Service:  8/31/2018  4:19 PM Status:  Addendum    :  Lashay Schneider MD (Physician)    Related Notes:  Original Note by Lashay Schneider MD (Physician) filed at 8/31/2018  4:45 PM       [address] [facility] [address] • ASHD (arteriosclerotic heart disease)    • Carotid artery stenosis, asymptomatic, right     60-69% on dopplers 9/2016   • Chronic kidney disease (CKD), stage 3 (moderate)    • Dementia 8/28/2014   • Depression 8/28/2014   • Essential hypertension with go Disp:  Rfl:  Taking   Vitamin D3 (VITAMIN D3) 2000 UNITS Oral Cap Take 2,000 Units by mouth daily. Disp:  Rfl:  Taking[CS. 2]       Review of Systems:  Positive in BOLD  General:  Fevers/chills, fatigue  Skin:  skin lesions or rashes  Eyes:  visual complain TP 6.6 08/31/2018   AST 16 08/31/2018   ALT 11 08/31/2018[CS.2]       Imaging:    CT Brain 8/31/18:  CONCLUSION:   1. No acute intracranial process by noncontrast CT technique.   2. Senescent changes of parenchymal volume loss with sequela of chronic microa -IV fluids given  -With above workup negative, family was interested in moving patient within the [facility] facility to a higher level of care.   This is something they had already been considering for some time  -[facility] Day #[CS.1] 0[CS. 2] PCP[CS.1] Kalyani Benz MD[CS.2]     Date:  8/31/2018  Date of Admission:  8/31/2018    History of Present Illness:[CS.1]  Tati Everett. 2] is a(n)[CS.3 81year old[CS.2] female with a history of dementia, hypertension, hyperlipi • [facility]    • Thyroid nodule 8/28/2014   • Vitamin D deficiency 8/28/2014     Past Surgical History:  No date: CABG  2005: COLONOSCOPY  9/13/16: OTHER SURGICAL HISTORY      Comment: ORIF left radius  No date: OTHER SURGICAL HISTORY Cardiovascular:  chest pain, exertional dyspnea, palpitations  Gastrointestinal:  nausea/vomiting, constipation, diarrhea, rectal bleeding, heartburn  Genitourinary:  dysuria, urgency, frequency, incontinence.   Musculoskeletal:  joint pain, swelling, muscl 4. Lesser incidental findings as above.     Assessment:[CS.1]  Patient Active Problem List:     Anemia in stage 3 chronic kidney disease (HCC)     Vitamin D deficiency     Thyroid nodule     Primary biliary cholangitis (HCC)     Carotid artery stenosis, asy worsening renal function    Essential Hypertension  -was hypertensive during admission, but hadn't taken her medication today  -per family, patient has \"white coat hypertension\" as well and always runs a little higher in the hospital    Chronic Anemia  -

## (undated) NOTE — ED AVS SNAPSHOT
Cydney Saint   MRN: [id number]    Department:  [facility] Emergency Department   Date of Visit:  6/19/2019           Disclosure     Insurance plans vary and the physician(s) referred by the ER may not be covered by your plan.  Please contact yo within the next three months to obtain basic health screening including reassessment of your blood pressure.     IF THERE IS ANY CHANGE OR WORSENING OF YOUR CONDITION, CALL YOUR PRIMARY CARE PHYSICIAN AT ONCE OR RETURN IMMEDIATELY TO THE EMERGENCY DEPARTMEN

## (undated) NOTE — ED AVS SNAPSHOT
Yifan    MRN: [id number]    Department:  [facility] Emergency Department   Date of Visit:  11/26/2019           Disclosure     Insurance plans vary and the physician(s) referred by the ER may not be covered by your plan.  Please contact y within the next three months to obtain basic health screening including reassessment of your blood pressure.     IF THERE IS ANY CHANGE OR WORSENING OF YOUR CONDITION, CALL YOUR PRIMARY CARE PHYSICIAN AT ONCE OR RETURN IMMEDIATELY TO THE EMERGENCY DEPARTMEN